# Patient Record
Sex: FEMALE | Race: WHITE | NOT HISPANIC OR LATINO | URBAN - METROPOLITAN AREA
[De-identification: names, ages, dates, MRNs, and addresses within clinical notes are randomized per-mention and may not be internally consistent; named-entity substitution may affect disease eponyms.]

---

## 2019-12-15 ENCOUNTER — INPATIENT (INPATIENT)
Facility: HOSPITAL | Age: 69
LOS: 4 days | Discharge: HOME | DRG: 392 | End: 2019-12-20
Attending: INTERNAL MEDICINE | Admitting: INTERNAL MEDICINE
Payer: COMMERCIAL

## 2019-12-15 VITALS
SYSTOLIC BLOOD PRESSURE: 116 MMHG | HEART RATE: 101 BPM | DIASTOLIC BLOOD PRESSURE: 76 MMHG | HEIGHT: 64 IN | RESPIRATION RATE: 17 BRPM | WEIGHT: 222.01 LBS | OXYGEN SATURATION: 98 % | TEMPERATURE: 98 F

## 2019-12-15 DIAGNOSIS — Z02.9 ENCOUNTER FOR ADMINISTRATIVE EXAMINATIONS, UNSPECIFIED: ICD-10-CM

## 2019-12-15 DIAGNOSIS — D64.9 ANEMIA, UNSPECIFIED: ICD-10-CM

## 2019-12-15 DIAGNOSIS — Z29.9 ENCOUNTER FOR PROPHYLACTIC MEASURES, UNSPECIFIED: ICD-10-CM

## 2019-12-15 DIAGNOSIS — K59.01 SLOW TRANSIT CONSTIPATION: ICD-10-CM

## 2019-12-15 DIAGNOSIS — N18.4 CHRONIC KIDNEY DISEASE, STAGE 4 (SEVERE): ICD-10-CM

## 2019-12-15 DIAGNOSIS — D69.6 THROMBOCYTOPENIA, UNSPECIFIED: ICD-10-CM

## 2019-12-15 DIAGNOSIS — E03.9 HYPOTHYROIDISM, UNSPECIFIED: ICD-10-CM

## 2019-12-15 DIAGNOSIS — C53.9 MALIGNANT NEOPLASM OF CERVIX UTERI, UNSPECIFIED: ICD-10-CM

## 2019-12-15 LAB
ALBUMIN SERPL ELPH-MCNC: 3.6 G/DL — SIGNIFICANT CHANGE UP (ref 3.3–5)
ALP SERPL-CCNC: 133 U/L — HIGH (ref 40–120)
ALT FLD-CCNC: 14 U/L — SIGNIFICANT CHANGE UP (ref 10–45)
ANION GAP SERPL CALC-SCNC: 14 MMOL/L — SIGNIFICANT CHANGE UP (ref 5–17)
APPEARANCE UR: ABNORMAL
APTT BLD: 31.6 SEC — SIGNIFICANT CHANGE UP (ref 27.5–36.3)
AST SERPL-CCNC: 19 U/L — SIGNIFICANT CHANGE UP (ref 10–40)
BACTERIA # UR AUTO: NEGATIVE — SIGNIFICANT CHANGE UP
BASE EXCESS BLDV CALC-SCNC: -1.1 MMOL/L — SIGNIFICANT CHANGE UP (ref -2–2)
BASOPHILS # BLD AUTO: 0.03 K/UL — SIGNIFICANT CHANGE UP (ref 0–0.2)
BASOPHILS NFR BLD AUTO: 0.6 % — SIGNIFICANT CHANGE UP (ref 0–2)
BILIRUB SERPL-MCNC: 0.4 MG/DL — SIGNIFICANT CHANGE UP (ref 0.2–1.2)
BILIRUB UR-MCNC: NEGATIVE — SIGNIFICANT CHANGE UP
BLD GP AB SCN SERPL QL: NEGATIVE — SIGNIFICANT CHANGE UP
BUN SERPL-MCNC: 30 MG/DL — HIGH (ref 7–23)
CA-I SERPL-SCNC: 1.19 MMOL/L — SIGNIFICANT CHANGE UP (ref 1.12–1.3)
CALCIUM SERPL-MCNC: 9.8 MG/DL — SIGNIFICANT CHANGE UP (ref 8.4–10.5)
CHLORIDE BLDV-SCNC: 98 MMOL/L — SIGNIFICANT CHANGE UP (ref 96–108)
CHLORIDE SERPL-SCNC: 96 MMOL/L — SIGNIFICANT CHANGE UP (ref 96–108)
CO2 BLDV-SCNC: 25 MMOL/L — SIGNIFICANT CHANGE UP (ref 22–30)
CO2 SERPL-SCNC: 22 MMOL/L — SIGNIFICANT CHANGE UP (ref 22–31)
COLOR SPEC: SIGNIFICANT CHANGE UP
CREAT SERPL-MCNC: 1.97 MG/DL — HIGH (ref 0.5–1.3)
DIFF PNL FLD: ABNORMAL
EOSINOPHIL # BLD AUTO: 0.03 K/UL — SIGNIFICANT CHANGE UP (ref 0–0.5)
EOSINOPHIL NFR BLD AUTO: 0.6 % — SIGNIFICANT CHANGE UP (ref 0–6)
EPI CELLS # UR: 1 /HPF — SIGNIFICANT CHANGE UP
GAS PNL BLDV: 133 MMOL/L — LOW (ref 135–145)
GAS PNL BLDV: SIGNIFICANT CHANGE UP
GLUCOSE BLDV-MCNC: 87 MG/DL — SIGNIFICANT CHANGE UP (ref 70–99)
GLUCOSE SERPL-MCNC: 90 MG/DL — SIGNIFICANT CHANGE UP (ref 70–99)
GLUCOSE UR QL: NEGATIVE — SIGNIFICANT CHANGE UP
HCO3 BLDV-SCNC: 24 MMOL/L — SIGNIFICANT CHANGE UP (ref 21–29)
HCT VFR BLD CALC: 26 % — LOW (ref 34.5–45)
HCT VFR BLDA CALC: 27 % — LOW (ref 39–50)
HGB BLD CALC-MCNC: 8.6 G/DL — LOW (ref 11.5–15.5)
HGB BLD-MCNC: 8.7 G/DL — LOW (ref 11.5–15.5)
HYALINE CASTS # UR AUTO: 2 /LPF — SIGNIFICANT CHANGE UP (ref 0–2)
IMM GRANULOCYTES NFR BLD AUTO: 1.9 % — HIGH (ref 0–1.5)
INR BLD: 1.15 RATIO — SIGNIFICANT CHANGE UP (ref 0.88–1.16)
KETONES UR-MCNC: ABNORMAL
LACTATE BLDV-MCNC: 1.2 MMOL/L — SIGNIFICANT CHANGE UP (ref 0.7–2)
LEUKOCYTE ESTERASE UR-ACNC: ABNORMAL
LYMPHOCYTES # BLD AUTO: 0.67 K/UL — LOW (ref 1–3.3)
LYMPHOCYTES # BLD AUTO: 12.7 % — LOW (ref 13–44)
MANUAL SMEAR VERIFICATION: SIGNIFICANT CHANGE UP
MCHC RBC-ENTMCNC: 32.7 PG — SIGNIFICANT CHANGE UP (ref 27–34)
MCHC RBC-ENTMCNC: 33.5 GM/DL — SIGNIFICANT CHANGE UP (ref 32–36)
MCV RBC AUTO: 97.7 FL — SIGNIFICANT CHANGE UP (ref 80–100)
MONOCYTES # BLD AUTO: 0.69 K/UL — SIGNIFICANT CHANGE UP (ref 0–0.9)
MONOCYTES NFR BLD AUTO: 13.1 % — SIGNIFICANT CHANGE UP (ref 2–14)
NEUTROPHILS # BLD AUTO: 3.75 K/UL — SIGNIFICANT CHANGE UP (ref 1.8–7.4)
NEUTROPHILS NFR BLD AUTO: 71.1 % — SIGNIFICANT CHANGE UP (ref 43–77)
NITRITE UR-MCNC: NEGATIVE — SIGNIFICANT CHANGE UP
NRBC # BLD: 0 /100 WBCS — SIGNIFICANT CHANGE UP (ref 0–0)
OTHER CELLS CSF MANUAL: 4 ML/DL — LOW (ref 18–22)
PCO2 BLDV: 42 MMHG — SIGNIFICANT CHANGE UP (ref 35–50)
PH BLDV: 7.37 — SIGNIFICANT CHANGE UP (ref 7.35–7.45)
PH UR: 6.5 — SIGNIFICANT CHANGE UP (ref 5–8)
PLAT MORPH BLD: NORMAL — SIGNIFICANT CHANGE UP
PLATELET # BLD AUTO: 88 K/UL — LOW (ref 150–400)
PO2 BLDV: 22 MMHG — LOW (ref 25–45)
POTASSIUM BLDV-SCNC: 3.2 MMOL/L — LOW (ref 3.5–5.3)
POTASSIUM SERPL-MCNC: 3.4 MMOL/L — LOW (ref 3.5–5.3)
POTASSIUM SERPL-SCNC: 3.4 MMOL/L — LOW (ref 3.5–5.3)
PROT SERPL-MCNC: 7.2 G/DL — SIGNIFICANT CHANGE UP (ref 6–8.3)
PROT UR-MCNC: ABNORMAL
PROTHROM AB SERPL-ACNC: 13.2 SEC — HIGH (ref 10–12.9)
RBC # BLD: 2.66 M/UL — LOW (ref 3.8–5.2)
RBC # FLD: 20.4 % — HIGH (ref 10.3–14.5)
RBC BLD AUTO: SIGNIFICANT CHANGE UP
RBC CASTS # UR COMP ASSIST: 1668 /HPF — HIGH (ref 0–4)
RH IG SCN BLD-IMP: NEGATIVE — SIGNIFICANT CHANGE UP
RH IG SCN BLD-IMP: NEGATIVE — SIGNIFICANT CHANGE UP
SAO2 % BLDV: 30 % — LOW (ref 67–88)
SODIUM SERPL-SCNC: 132 MMOL/L — LOW (ref 135–145)
SP GR SPEC: 1.01 — SIGNIFICANT CHANGE UP (ref 1.01–1.02)
TSH SERPL-MCNC: 77.2 UIU/ML — HIGH (ref 0.27–4.2)
UROBILINOGEN FLD QL: NEGATIVE — SIGNIFICANT CHANGE UP
WBC # BLD: 5.27 K/UL — SIGNIFICANT CHANGE UP (ref 3.8–10.5)
WBC # FLD AUTO: 5.27 K/UL — SIGNIFICANT CHANGE UP (ref 3.8–10.5)
WBC UR QL: 145 /HPF — HIGH (ref 0–5)

## 2019-12-15 PROCEDURE — 99285 EMERGENCY DEPT VISIT HI MDM: CPT

## 2019-12-15 PROCEDURE — 99223 1ST HOSP IP/OBS HIGH 75: CPT

## 2019-12-15 PROCEDURE — 74176 CT ABD & PELVIS W/O CONTRAST: CPT | Mod: 26

## 2019-12-15 RX ORDER — SODIUM CHLORIDE 9 MG/ML
1000 INJECTION INTRAMUSCULAR; INTRAVENOUS; SUBCUTANEOUS
Refills: 0 | Status: DISCONTINUED | OUTPATIENT
Start: 2019-12-15 | End: 2019-12-18

## 2019-12-15 RX ORDER — HEPARIN SODIUM 5000 [USP'U]/ML
5000 INJECTION INTRAVENOUS; SUBCUTANEOUS EVERY 12 HOURS
Refills: 0 | Status: DISCONTINUED | OUTPATIENT
Start: 2019-12-15 | End: 2019-12-20

## 2019-12-15 RX ORDER — AMLODIPINE BESYLATE 2.5 MG/1
5 TABLET ORAL DAILY
Refills: 0 | Status: DISCONTINUED | OUTPATIENT
Start: 2019-12-15 | End: 2019-12-18

## 2019-12-15 RX ORDER — POLYETHYLENE GLYCOL 3350 17 G/17G
17 POWDER, FOR SOLUTION ORAL
Refills: 0 | Status: DISCONTINUED | OUTPATIENT
Start: 2019-12-15 | End: 2019-12-16

## 2019-12-15 RX ORDER — LEVOTHYROXINE SODIUM 125 MCG
250 TABLET ORAL DAILY
Refills: 0 | Status: DISCONTINUED | OUTPATIENT
Start: 2019-12-15 | End: 2019-12-20

## 2019-12-15 RX ORDER — SENNA PLUS 8.6 MG/1
2 TABLET ORAL AT BEDTIME
Refills: 0 | Status: DISCONTINUED | OUTPATIENT
Start: 2019-12-15 | End: 2019-12-20

## 2019-12-15 RX ORDER — POTASSIUM CHLORIDE 20 MEQ
20 PACKET (EA) ORAL ONCE
Refills: 0 | Status: COMPLETED | OUTPATIENT
Start: 2019-12-15 | End: 2019-12-15

## 2019-12-15 RX ADMIN — SENNA PLUS 2 TABLET(S): 8.6 TABLET ORAL at 21:56

## 2019-12-15 RX ADMIN — SODIUM CHLORIDE 75 MILLILITER(S): 9 INJECTION INTRAMUSCULAR; INTRAVENOUS; SUBCUTANEOUS at 19:30

## 2019-12-15 RX ADMIN — SODIUM CHLORIDE 75 MILLILITER(S): 9 INJECTION INTRAMUSCULAR; INTRAVENOUS; SUBCUTANEOUS at 21:55

## 2019-12-15 RX ADMIN — Medication 20 MILLIEQUIVALENT(S): at 19:27

## 2019-12-15 RX ADMIN — Medication 250 MICROGRAM(S): at 19:26

## 2019-12-15 NOTE — ED ADULT NURSE REASSESSMENT NOTE - NS ED NURSE REASSESS COMMENT FT1
Report taken from Maria Guadalupe EVANS in [place]. Pt resting in stretcher in NAD and VS as documented with family at bedside. Pt finished drinking oral contrast and is pending CT. Pt and family encouraged to communicate any needs to staff.

## 2019-12-15 NOTE — ED PROVIDER NOTE - ATTENDING CONTRIBUTION TO CARE
Attending MD Kwan.  Agree with above.  Pt is a 70 yo female with pmhx of cervical CA prob stage III (cervical, uterine and nodes), recently completed radiation 2 mos ago, now on chemo.  Pt tried enemas with no relief.  Passing gas, able to tolerate PO.  Pt presents with diffuse abdominal distention, no BM x 7 days.  Pt is also hypothyroid. Attending MD Kwan.  Agree with above.  Pt is a 70 yo female with pmhx of cervical CA prob stage III (cervical, uterine and nodes), recently completed radiation 2 mos ago, now on chemo.  Pt tried enemas with no relief.  Passing gas, able to tolerate PO.  Pt presents with diffuse abdominal distention, no BM x 7 days.  Pt is also hypothyroid.  Planned labs, CT and reassessment.    CT non-actionable but Cr found to be elevated from baseline with concern for KENDALL.  Stable for admission to medicine for KENDALL of unclear origin and constipation.

## 2019-12-15 NOTE — ED ADULT NURSE NOTE - OBJECTIVE STATEMENT
1205 69 yr old WF brought to Er by family for further eval and tx of constipation, no BM x 12 days. PMH Cervical Cancer. Last chemo 11/15/2019. Has mediport right chest. A&Ox4. color pink. skin W&D. Lungs clear. abd soft. Denies pain, N/V, dysura, back pain, fever or chills.

## 2019-12-15 NOTE — ED PROVIDER NOTE - CLINICAL SUMMARY MEDICAL DECISION MAKING FREE TEXT BOX
Agustin PGY3: 69F cervical ca c/b mets to uterus, lymph nodes, s/p radiation therapy dc'd 10/15, oral chemo 11/15 presents with a cc of lower abdominal discomfort x7-10 days, no bowel movements, is passing gas, no prior abdominal surgeries, exam vss non toxic appearing non tender abdomen c/f sbo vs constipation will check labs ctap pain control reassess

## 2019-12-15 NOTE — H&P ADULT - ATTENDING COMMENTS
plan of care discussed with medicine NP   patient admitted on behalf of private medical doctor/group, who are aware of admission to hospital and are responsible for further management

## 2019-12-15 NOTE — ED ADULT NURSE NOTE - NSIMPLEMENTINTERV_GEN_ALL_ED
Implemented All Universal Safety Interventions:  [place] to call system. Call bell, personal items and telephone within reach. Instruct patient to call for assistance. Room bathroom lighting operational. Non-slip footwear when patient is off stretcher. Physically safe environment: no spills, clutter or unnecessary equipment. Stretcher in lowest position, wheels locked, appropriate side rails in place.

## 2019-12-15 NOTE — H&P ADULT - PROBLEM SELECTOR PLAN 8
Transitions of Care Status:  1.  Name of PCP: KITTY  2.  PCP Contacted on Admission: [ ] Y    [x ] N    3.  PCP contacted at Discharge: [ ] Y    [ ] N    [ ] N/A  4.  Post-Discharge Appointment Date and Location:  5.  Summary of Handoff given to PCP:

## 2019-12-15 NOTE — ED PROVIDER NOTE - CARE PLAN
Principal Discharge DX:	Hypothyroid  Secondary Diagnosis:	Constipation  Secondary Diagnosis:	KENDALL (acute kidney injury)

## 2019-12-15 NOTE — H&P ADULT - PROBLEM SELECTOR PLAN 2
elevated TSH likely in part due to poor compliance.  Synthroid dose raised for now pending ENDO consult in AM

## 2019-12-15 NOTE — H&P ADULT - NSHPLABSRESULTS_GEN_ALL_CORE
RBC Count: 2.66 M/uL <L> (12-15 @ 13:46)  Hemoglobin: 8.7 g/dL <L> (12-15 @ 13:46)  Platelet Count - Automated: 88 K/uL <L> (12-15 @ 13:46)  WBC Count: 5.27 K/uL (12-15 @ 13:46)  Hematocrit: 26.0 % <L> (12-15 @ 13:46)      12-15    132<L>  |  96  |  30<H>  ----------------------------<  90  3.4<L>   |  22  |  1.97<H>    Ca    9.8      15 Dec 2019 13:46    TPro  7.2  /  Alb  3.6  /  TBili  0.4  /  DBili  x   /  AST  19  /  ALT  14  /  AlkPhos  133<H>  12-15                PT/INR - ( 15 Dec 2019 13:46 )   PT: 13.2 sec;   INR: 1.15 ratio         PTT - ( 15 Dec 2019 13:46 )  PTT:31.6 sec        Aspartate Aminotransferase (AST/SGOT): 19 U/L (12-15 @ 13:46)  Alkaline Phosphatase, Serum: 133 U/L <H> (12-15 @ 13:46)  Bilirubin Total, Serum: 0.4 mg/dL (12-15 @ 13:46)  Albumin, Serum: 3.6 g/dL (12-15 @ 13:46)  Alanine Aminotransferase (ALT/SGPT): 14 U/L (12-15 @ 13:46)            CT abdomen:   IMPRESSION:     No bowel obstruction.    Mild bilateral hydronephrosis to the level of the urinary bladder which   demonstrates mild wall thickening and surrounding inflammatory changes.   Correlate with urinalysis; findings may be related to radiation changes.

## 2019-12-15 NOTE — ED PROVIDER NOTE - PHYSICAL EXAMINATION
GEN APPEARANCE: WDWN, alert and cooperative, non-toxic appearing and in NAD  HEAD: Atraumatic, normocephalic   EYES: PERRLa, EOMI, vision grossly intact.   EARS: Gross hearing intact.   NOSE: No nasal discharge, no external evidence of epistaxis.   NECK: Supple  CV: RRR, S1S2, no c/r/m/g. No cyanosis or pallor. Extremities warm, well perfused. Cap refill <2 seconds. No bruits.   LUNGS: CTAB. No wheezing. No rales. No rhonchi. No diminished breath sounds.   ABDOMEN: Soft, NTND. No guarding or rebound. No masses. Abdominal fullness.  MSK: Spine appears normal, no spine point tenderness. No CVA ttp. No joint erythema or tenderness. Normal muscular development. Pelvis stable.  EXTREMITIES: No peripheral edema. No obvious joint or bony deformity.  NEURO: Alert, follows commands. Weight bearing normal. Speech normal. Sensation and motor normal x4 extremities.   SKIN: Normal color for race, warm, dry and intact. No evidence of rash.  PSYCH: Normal mood and affect.

## 2019-12-15 NOTE — H&P ADULT - HISTORY OF PRESENT ILLNESS
69F cervical ca c/b mets to uterus, lymph nodes, s/p radiation therapy dc'd 10/15, oral chemo 11/15 presents with a cc of constipation x7-10 days, no bowel movements, is passing gas, no prior abdominal surgeries, no urinary sx. Tolerating PO today. tried saline enema without improvement. Denies f/c/cp/sob. Denies headache, syncope, lightheadedness, dizziness.   CT abdomen in ER shows no SBO.  No N/V.  Fair PO intake.  TSH 77 in ER - patient admits to poor compliance with Synthroid recent weeks.

## 2019-12-15 NOTE — ED PROVIDER NOTE - OBJECTIVE STATEMENT
69F cervical ca c/b mets to uterus, lymph nodes, s/p radiation therapy dc'd 10/15, oral chemo 11/15 presents with a cc of lower abdominal discomfort x7-10 days, no bowel movements, is passing gas, no prior abdominal surgeries, no urinary sx. Tolerating PO today. tried saline enema without improvement. Denies f/c/cp/sob. Denies headache, syncope, lightheadedness, dizziness. Denies chest palpitations, Denies dysuria, hematuria, hematochezia, BRBPR, tarry stools, diarrhea,

## 2019-12-16 LAB
ALBUMIN SERPL ELPH-MCNC: 3.2 G/DL — LOW (ref 3.3–5)
ALP SERPL-CCNC: 119 U/L — SIGNIFICANT CHANGE UP (ref 40–120)
ALT FLD-CCNC: 11 U/L — SIGNIFICANT CHANGE UP (ref 10–45)
ANION GAP SERPL CALC-SCNC: 15 MMOL/L — SIGNIFICANT CHANGE UP (ref 5–17)
AST SERPL-CCNC: 16 U/L — SIGNIFICANT CHANGE UP (ref 10–40)
BILIRUB SERPL-MCNC: 0.3 MG/DL — SIGNIFICANT CHANGE UP (ref 0.2–1.2)
BUN SERPL-MCNC: 28 MG/DL — HIGH (ref 7–23)
CALCIUM SERPL-MCNC: 9.4 MG/DL — SIGNIFICANT CHANGE UP (ref 8.4–10.5)
CHLORIDE SERPL-SCNC: 97 MMOL/L — SIGNIFICANT CHANGE UP (ref 96–108)
CO2 SERPL-SCNC: 20 MMOL/L — LOW (ref 22–31)
CREAT SERPL-MCNC: 1.8 MG/DL — HIGH (ref 0.5–1.3)
GLUCOSE SERPL-MCNC: 87 MG/DL — SIGNIFICANT CHANGE UP (ref 70–99)
HCT VFR BLD CALC: 23.1 % — LOW (ref 34.5–45)
HCV AB S/CO SERPL IA: 0.08 S/CO — SIGNIFICANT CHANGE UP (ref 0–0.99)
HCV AB SERPL-IMP: SIGNIFICANT CHANGE UP
HGB BLD-MCNC: 7.9 G/DL — LOW (ref 11.5–15.5)
MCHC RBC-ENTMCNC: 33.6 PG — SIGNIFICANT CHANGE UP (ref 27–34)
MCHC RBC-ENTMCNC: 34.2 GM/DL — SIGNIFICANT CHANGE UP (ref 32–36)
MCV RBC AUTO: 98.3 FL — SIGNIFICANT CHANGE UP (ref 80–100)
PLATELET # BLD AUTO: 87 K/UL — LOW (ref 150–400)
POTASSIUM SERPL-MCNC: 3.8 MMOL/L — SIGNIFICANT CHANGE UP (ref 3.5–5.3)
POTASSIUM SERPL-SCNC: 3.8 MMOL/L — SIGNIFICANT CHANGE UP (ref 3.5–5.3)
PROT SERPL-MCNC: 6.6 G/DL — SIGNIFICANT CHANGE UP (ref 6–8.3)
RBC # BLD: 2.35 M/UL — LOW (ref 3.8–5.2)
RBC # FLD: 20.7 % — HIGH (ref 10.3–14.5)
SODIUM SERPL-SCNC: 132 MMOL/L — LOW (ref 135–145)
T3 SERPL-MCNC: 45 NG/DL — LOW (ref 80–200)
T4 AB SER-ACNC: 3.7 UG/DL — LOW (ref 4.6–12)
WBC # BLD: 5.22 K/UL — SIGNIFICANT CHANGE UP (ref 3.8–10.5)
WBC # FLD AUTO: 5.22 K/UL — SIGNIFICANT CHANGE UP (ref 3.8–10.5)

## 2019-12-16 PROCEDURE — 71045 X-RAY EXAM CHEST 1 VIEW: CPT | Mod: 26

## 2019-12-16 RX ORDER — ONDANSETRON 8 MG/1
1 TABLET, FILM COATED ORAL
Qty: 0 | Refills: 0 | DISCHARGE

## 2019-12-16 RX ORDER — POLYETHYLENE GLYCOL 3350 17 G/17G
17 POWDER, FOR SOLUTION ORAL
Refills: 0 | Status: DISCONTINUED | OUTPATIENT
Start: 2019-12-16 | End: 2019-12-17

## 2019-12-16 RX ORDER — PANTOPRAZOLE SODIUM 20 MG/1
1 TABLET, DELAYED RELEASE ORAL
Qty: 0 | Refills: 0 | DISCHARGE

## 2019-12-16 RX ORDER — AMLODIPINE BESYLATE 2.5 MG/1
1 TABLET ORAL
Qty: 0 | Refills: 0 | DISCHARGE

## 2019-12-16 RX ORDER — ONDANSETRON 8 MG/1
4 TABLET, FILM COATED ORAL ONCE
Refills: 0 | Status: COMPLETED | OUTPATIENT
Start: 2019-12-16 | End: 2019-12-16

## 2019-12-16 RX ADMIN — Medication 250 MICROGRAM(S): at 06:18

## 2019-12-16 RX ADMIN — AMLODIPINE BESYLATE 5 MILLIGRAM(S): 2.5 TABLET ORAL at 05:59

## 2019-12-16 RX ADMIN — SODIUM CHLORIDE 75 MILLILITER(S): 9 INJECTION INTRAMUSCULAR; INTRAVENOUS; SUBCUTANEOUS at 11:04

## 2019-12-16 RX ADMIN — Medication 10 MILLIGRAM(S): at 13:36

## 2019-12-16 RX ADMIN — POLYETHYLENE GLYCOL 3350 17 GRAM(S): 17 POWDER, FOR SOLUTION ORAL at 06:00

## 2019-12-16 RX ADMIN — ONDANSETRON 4 MILLIGRAM(S): 8 TABLET, FILM COATED ORAL at 20:47

## 2019-12-16 NOTE — PROGRESS NOTE ADULT - SUBJECTIVE AND OBJECTIVE BOX
INTERVAL HPI/OVERNIGHT EVENTS:  Pt seen and examined at bedside.     Allergies/Intolerance: No Known Allergies      MEDICATIONS  (STANDING):  amLODIPine   Tablet 5 milliGRAM(s) Oral daily  heparin  Injectable 5000 Unit(s) SubCutaneous every 12 hours  levothyroxine 250 MICROGram(s) Oral daily  polyethylene glycol 3350 17 Gram(s) Oral two times a day  senna 2 Tablet(s) Oral at bedtime  sodium chloride 0.9%. 1000 milliLiter(s) (75 mL/Hr) IV Continuous <Continuous>    MEDICATIONS  (PRN):  bisacodyl Suppository 10 milliGRAM(s) Rectal daily PRN Constipation        ROS: all systems reviewed and wnl      PHYSICAL EXAMINATION:  Vital Signs Last 24 Hrs  T(C): 36.4 (16 Dec 2019 06:00), Max: 36.7 (15 Dec 2019 13:00)  T(F): 97.5 (16 Dec 2019 06:00), Max: 98.1 (15 Dec 2019 15:23)  HR: 73 (16 Dec 2019 06:00) (73 - 101)  BP: 135/79 (16 Dec 2019 06:00) (98/53 - 139/79)  BP(mean): --  RR: 18 (16 Dec 2019 06:00) (16 - 18)  SpO2: 98% (16 Dec 2019 06:00) (97% - 100%)  CAPILLARY BLOOD GLUCOSE          12-15 @ 07:01  -  12-16 @ 07:00  --------------------------------------------------------  IN: 300 mL / OUT: 0 mL / NET: 300 mL        GENERAL:   NECK: supple, No JVD  CHEST/LUNG: clear to auscultation bilaterally; no rales, rhonchi, or wheezing b/l  HEART: normal S1, S2  ABDOMEN: BS+, soft, ND, NT   EXTREMITIES:  pulses palpable; no clubbing, cyanosis, or edema b/l LEs  SKIN: no rashes or lesions      LABS:                        8.7    5.27  )-----------( 88       ( 15 Dec 2019 13:46 )             26.0     12-15    132<L>  |  96  |  30<H>  ----------------------------<  90  3.4<L>   |  22  |  1.97<H>    Ca    9.8      15 Dec 2019 13:46    TPro  7.2  /  Alb  3.6  /  TBili  0.4  /  DBili  x   /  AST  19  /  ALT  14  /  AlkPhos  133<H>  12-15    PT/INR - ( 15 Dec 2019 13:46 )   PT: 13.2 sec;   INR: 1.15 ratio         PTT - ( 15 Dec 2019 13:46 )  PTT:31.6 sec  Urinalysis Basic - ( 15 Dec 2019 21:12 )    Color: BROWN / Appearance: Slightly Turbid / S.011 / pH: x  Gluc: x / Ketone: Small  / Bili: Negative / Urobili: Negative   Blood: x / Protein: 30 mg/dL / Nitrite: Negative   Leuk Esterase: Large / RBC: 1668 /hpf /  /HPF   Sq Epi: x / Non Sq Epi: 1 /hpf / Bacteria: Negative INTERVAL HPI/OVERNIGHT EVENTS:  Pt seen and examined at bedside.     Allergies/Intolerance: No Known Allergies      MEDICATIONS  (STANDING):  amLODIPine   Tablet 5 milliGRAM(s) Oral daily  heparin  Injectable 5000 Unit(s) SubCutaneous every 12 hours  levothyroxine 250 MICROGram(s) Oral daily  polyethylene glycol 3350 17 Gram(s) Oral two times a day  senna 2 Tablet(s) Oral at bedtime  sodium chloride 0.9%. 1000 milliLiter(s) (75 mL/Hr) IV Continuous <Continuous>    MEDICATIONS  (PRN):  bisacodyl Suppository 10 milliGRAM(s) Rectal daily PRN Constipation        ROS: all systems reviewed and wnl      PHYSICAL EXAMINATION:  Vital Signs Last 24 Hrs  T(C): 36.4 (16 Dec 2019 06:00), Max: 36.7 (15 Dec 2019 13:00)  T(F): 97.5 (16 Dec 2019 06:00), Max: 98.1 (15 Dec 2019 15:23)  HR: 73 (16 Dec 2019 06:00) (73 - 101)  BP: 135/79 (16 Dec 2019 06:00) (98/53 - 139/79)  BP(mean): --  RR: 18 (16 Dec 2019 06:00) (16 - 18)  SpO2: 98% (16 Dec 2019 06:00) (97% - 100%)  CAPILLARY BLOOD GLUCOSE          12-15 @ 07:01  -  12-16 @ 07:00  --------------------------------------------------------  IN: 300 mL / OUT: 0 mL / NET: 300 mL        GENERAL: stable in chair, comfortable, no abdominal pain or fevers, still constipated  NECK: supple, No JVD  CHEST/LUNG: clear to auscultation bilaterally; no rales, rhonchi, or wheezing b/l  HEART: normal S1, S2  ABDOMEN: BS+, soft, ND, NT   EXTREMITIES:  pulses palpable; no clubbing, cyanosis, or edema b/l LEs  SKIN: no rashes or lesions      LABS:                        8.7    5.27  )-----------( 88       ( 15 Dec 2019 13:46 )             26.0     12-15    132<L>  |  96  |  30<H>  ----------------------------<  90  3.4<L>   |  22  |  1.97<H>    Ca    9.8      15 Dec 2019 13:46    TPro  7.2  /  Alb  3.6  /  TBili  0.4  /  DBili  x   /  AST  19  /  ALT  14  /  AlkPhos  133<H>  12-15    PT/INR - ( 15 Dec 2019 13:46 )   PT: 13.2 sec;   INR: 1.15 ratio         PTT - ( 15 Dec 2019 13:46 )  PTT:31.6 sec  Urinalysis Basic - ( 15 Dec 2019 21:12 )    Color: BROWN / Appearance: Slightly Turbid / S.011 / pH: x  Gluc: x / Ketone: Small  / Bili: Negative / Urobili: Negative   Blood: x / Protein: 30 mg/dL / Nitrite: Negative   Leuk Esterase: Large / RBC: 1668 /hpf /  /HPF   Sq Epi: x / Non Sq Epi: 1 /hpf / Bacteria: Negative

## 2019-12-16 NOTE — PROGRESS NOTE ADULT - PROBLEM SELECTOR PLAN 1
bowel regimen ordered.  No GI symptoms bowel regimen ordered. Added Miralax 17 grams bid, ducolox supp. daily for three days and oral senacott.

## 2019-12-16 NOTE — PROGRESS NOTE ADULT - ASSESSMENT
69 female with 69 year old female PMH cervical ca c/b mets to uterus, lymph nodes, s/p radiation therapy dc'd 10/15, oral chemo 11/15 presents with a cc of constipation x7-10 days, no bowel movements, is passing gas, no prior abdominal surgeries, no urinary sx. Tolerating PO today. tried saline enema without improvement at home. Denies f/c/cp/sob. Denies headache, syncope, lightheadedness, dizziness. CT abdomen in ER shows no SBO. TSH elevated 77 in ER - patient admits to poor compliance with Synthroid recent weeks. Restarted at higher dose 250 mcg/day.

## 2019-12-16 NOTE — PROGRESS NOTE ADULT - PROBLEM SELECTOR PLAN 4
avoid nephrotoxics. monitor intake and output. avoid nephrotoxics. monitor intake and output. Has KENDALL on CKD IV. IVF normal saline 75/h. Creatinine better today 1.80. Check daily.

## 2019-12-16 NOTE — PROGRESS NOTE ADULT - PROBLEM SELECTOR PLAN 2
elevated TSH likely in part due to poor compliance.  Synthroid dose raised for now pending ENDO consult in AM elevated TSH likely in part due to poor compliance. Increased dose to 250 mcg/day. TSH elevated 76, low free T-4 3.7. elevated TSH likely in part due to poor compliance. Increased dose to 250 mcg/day. TSH elevated 76, low free T-4 3.7. Repeat TSH in 4 weeks.

## 2019-12-17 LAB
ALBUMIN SERPL ELPH-MCNC: 3 G/DL — LOW (ref 3.3–5)
ALP SERPL-CCNC: 112 U/L — SIGNIFICANT CHANGE UP (ref 40–120)
ALT FLD-CCNC: 10 U/L — SIGNIFICANT CHANGE UP (ref 10–45)
ANION GAP SERPL CALC-SCNC: 15 MMOL/L — SIGNIFICANT CHANGE UP (ref 5–17)
AST SERPL-CCNC: 17 U/L — SIGNIFICANT CHANGE UP (ref 10–40)
BILIRUB SERPL-MCNC: 0.3 MG/DL — SIGNIFICANT CHANGE UP (ref 0.2–1.2)
BUN SERPL-MCNC: 28 MG/DL — HIGH (ref 7–23)
CALCIUM SERPL-MCNC: 8.2 MG/DL — LOW (ref 8.4–10.5)
CHLORIDE SERPL-SCNC: 96 MMOL/L — SIGNIFICANT CHANGE UP (ref 96–108)
CO2 SERPL-SCNC: 19 MMOL/L — LOW (ref 22–31)
CREAT SERPL-MCNC: 1.8 MG/DL — HIGH (ref 0.5–1.3)
GLUCOSE SERPL-MCNC: 107 MG/DL — HIGH (ref 70–99)
GRAM STN FLD: SIGNIFICANT CHANGE UP
GRAM STN FLD: SIGNIFICANT CHANGE UP
HCT VFR BLD CALC: 22.4 % — LOW (ref 34.5–45)
HGB BLD-MCNC: 7.3 G/DL — LOW (ref 11.5–15.5)
LDH SERPL L TO P-CCNC: 159 U/L — SIGNIFICANT CHANGE UP (ref 50–242)
MCHC RBC-ENTMCNC: 32.4 PG — SIGNIFICANT CHANGE UP (ref 27–34)
MCHC RBC-ENTMCNC: 32.6 GM/DL — SIGNIFICANT CHANGE UP (ref 32–36)
MCV RBC AUTO: 99.6 FL — SIGNIFICANT CHANGE UP (ref 80–100)
METHOD TYPE: SIGNIFICANT CHANGE UP
NRBC # BLD: 0 /100 WBCS — SIGNIFICANT CHANGE UP (ref 0–0)
P AERUGINOSA DNA BLD POS NAA+NON-PROBE: SIGNIFICANT CHANGE UP
PLATELET # BLD AUTO: 68 K/UL — LOW (ref 150–400)
POTASSIUM SERPL-MCNC: 3 MMOL/L — LOW (ref 3.5–5.3)
POTASSIUM SERPL-SCNC: 3 MMOL/L — LOW (ref 3.5–5.3)
PROT SERPL-MCNC: 5.7 G/DL — LOW (ref 6–8.3)
RBC # BLD: 2.25 M/UL — LOW (ref 3.8–5.2)
RBC # FLD: 21 % — HIGH (ref 10.3–14.5)
SODIUM SERPL-SCNC: 130 MMOL/L — LOW (ref 135–145)
SPECIMEN SOURCE: SIGNIFICANT CHANGE UP
SPECIMEN SOURCE: SIGNIFICANT CHANGE UP
WBC # BLD: 7.26 K/UL — SIGNIFICANT CHANGE UP (ref 3.8–10.5)
WBC # FLD AUTO: 7.26 K/UL — SIGNIFICANT CHANGE UP (ref 3.8–10.5)

## 2019-12-17 PROCEDURE — 99231 SBSQ HOSP IP/OBS SF/LOW 25: CPT

## 2019-12-17 RX ORDER — CEFTRIAXONE 500 MG/1
1000 INJECTION, POWDER, FOR SOLUTION INTRAMUSCULAR; INTRAVENOUS EVERY 24 HOURS
Refills: 0 | Status: DISCONTINUED | OUTPATIENT
Start: 2019-12-18 | End: 2019-12-18

## 2019-12-17 RX ORDER — CEFTRIAXONE 500 MG/1
1000 INJECTION, POWDER, FOR SOLUTION INTRAMUSCULAR; INTRAVENOUS ONCE
Refills: 0 | Status: COMPLETED | OUTPATIENT
Start: 2019-12-17 | End: 2019-12-17

## 2019-12-17 RX ORDER — POLYETHYLENE GLYCOL 3350 17 G/17G
17 POWDER, FOR SOLUTION ORAL DAILY
Refills: 0 | Status: DISCONTINUED | OUTPATIENT
Start: 2019-12-17 | End: 2019-12-20

## 2019-12-17 RX ORDER — CEFTRIAXONE 500 MG/1
INJECTION, POWDER, FOR SOLUTION INTRAMUSCULAR; INTRAVENOUS
Refills: 0 | Status: DISCONTINUED | OUTPATIENT
Start: 2019-12-17 | End: 2019-12-18

## 2019-12-17 RX ORDER — ACETAMINOPHEN 500 MG
650 TABLET ORAL ONCE
Refills: 0 | Status: COMPLETED | OUTPATIENT
Start: 2019-12-17 | End: 2019-12-17

## 2019-12-17 RX ORDER — POTASSIUM CHLORIDE 20 MEQ
20 PACKET (EA) ORAL
Refills: 0 | Status: COMPLETED | OUTPATIENT
Start: 2019-12-17 | End: 2019-12-17

## 2019-12-17 RX ORDER — SODIUM CHLORIDE 9 MG/ML
500 INJECTION INTRAMUSCULAR; INTRAVENOUS; SUBCUTANEOUS ONCE
Refills: 0 | Status: COMPLETED | OUTPATIENT
Start: 2019-12-17 | End: 2019-12-17

## 2019-12-17 RX ADMIN — Medication 650 MILLIGRAM(S): at 00:25

## 2019-12-17 RX ADMIN — Medication 20 MILLIEQUIVALENT(S): at 04:57

## 2019-12-17 RX ADMIN — SODIUM CHLORIDE 1000 MILLILITER(S): 9 INJECTION INTRAMUSCULAR; INTRAVENOUS; SUBCUTANEOUS at 00:26

## 2019-12-17 RX ADMIN — Medication 250 MICROGRAM(S): at 04:57

## 2019-12-17 RX ADMIN — POLYETHYLENE GLYCOL 3350 17 GRAM(S): 17 POWDER, FOR SOLUTION ORAL at 04:58

## 2019-12-17 RX ADMIN — Medication 650 MILLIGRAM(S): at 01:02

## 2019-12-17 RX ADMIN — Medication 20 MILLIEQUIVALENT(S): at 10:48

## 2019-12-17 RX ADMIN — Medication 20 MILLIEQUIVALENT(S): at 08:03

## 2019-12-17 RX ADMIN — CEFTRIAXONE 100 MILLIGRAM(S): 500 INJECTION, POWDER, FOR SOLUTION INTRAMUSCULAR; INTRAVENOUS at 00:26

## 2019-12-17 NOTE — CHART NOTE - NSCHARTNOTEFT_GEN_A_CORE
Called by RN to evaluate pt with bp of 70/49,  and temp of 100.3  No c/o lightheadedness, dizziness, sob, chest pain, palpitation or abdominal pain. however, mild c/o dysuria.    repeat vital sign below.     Vital Signs Last 24 Hrs  T(C): 37.9 (17 Dec 2019 00:12), Max: 37.9 (17 Dec 2019 00:12)  T(F): 100.3 (17 Dec 2019 00:12), Max: 100.3 (17 Dec 2019 00:12)  HR: 104 (17 Dec 2019 00:29) (73 - 124)  BP: 103/61 (17 Dec 2019 00:29) (70/49 - 139/79)  BP(mean): --  RR: 18 (17 Dec 2019 00:12) (18 - 18)  SpO2: 96% (17 Dec 2019 00:12) (96% - 100%)       Neuro : AAo x 4, Non-toxic appearance  lung : CTA throughout. no extra sound  cardio : s1 and s2. NO extra sound.  abdo : soft, ND/NT  LE : no edema.     < from: CT Abdomen and Pelvis w/ Oral Cont (12.15.19 @ 15:40) >    IMPRESSION:     No bowel obstruction.    Mild bilateral hydronephrosis to the level of the urinary bladder which   demonstrates mild wall thickening and surrounding inflammatory changes.   Correlate with urinalysis; findings may be related to radiation changes.    < end of copied text >    HPI:  69F cervical ca c/b mets to uterus, lymph nodes, s/p radiation therapy dc'd 10/15, oral chemo 11/15 presents with a cc of constipation x7-10 days, no bowel movements, is passing gas, no prior abdominal surgeries, no urinary sx. Tolerating PO today. tried saline enema without improvement. Denies f/c/cp/sob. Denies headache, syncope, lightheadedness, dizziness.     - hypotension likely s/s UTI   positive with UA and pending Ucx   send Bcx x 2   NS bolus 500ml x 1  give tylenol 650mg x 1  start CTX 1g stat and c/w CTX  c/w monitoring for now. No call back. Will assume that this was taken over by surgeon. Encounter closed.

## 2019-12-17 NOTE — CHART NOTE - NSCHARTNOTEFT_GEN_A_CORE
CC: positive blood cultures      HPI:  Called by RN to report positive blood cultures collected 12/17: "Growth in aerobic bottle: Gram Negative Rods." Patient resting comfortably in bed, NAD.         Vital Signs Last 24 Hrs  T(C): 36.4 (17 Dec 2019 22:48), Max: 37.9 (17 Dec 2019 00:12)  T(F): 97.6 (17 Dec 2019 22:48), Max: 100.3 (17 Dec 2019 00:12)  HR: 83 (17 Dec 2019 22:48) (76 - 106)  BP: 108/71 (17 Dec 2019 22:48) (70/49 - 112/67)12/17  RR: 18 (17 Dec 2019 22:48) (18 - 18)  SpO2: 100% (17 Dec 2019 22:48) (96% - 100%)      Physical Exam:  General: Morbidly obese female laying in bed, NAD, chronically ill-appearing  Head:  NC/AT  Skin: (+) warm, dry         Labs:                        7.3    7.26  )-----------( 68       ( 17 Dec 2019 01:05 )             22.4     12-17    130<L>  |  96  |  28<H>  ----------------------------<  107<H>  3.0<L>   |  19<L>  |  1.80<H>    Ca    8.2<L>      17 Dec 2019 01:05    TPro  5.7<L>  /  Alb  3.0<L>  /  TBili  0.3  /  DBili  x   /  AST  17  /  ALT  10  /  AlkPhos  112        Urinalysis Basic - ( 12-15 @ 21:12 )  Color: Negative / Appearance: Negative / SG: -- / pH: Negative  Gluc: Large / Ketone: BROWN  / Bili: -- / Urobili: 2   Blood: 1 / Protein: -- / Nitrite: Small   Leuk Esterase: Large / RBC: 1.011 / WBC --   Sq Epi: 30 mg/dL / Non Sq Epi: 1668 / Bacteria: 6.5        Culture - Blood (collected 12-17-19 @ 03:09)  Source: .Blood Blood-Peripheral  Gram Stain (12-17-19 @ 22:34):    Growth in aerobic bottle: Gram Negative Rods  Preliminary Report (12-17-19 @ 22:34):    Growth in aerobic bottle: Gram Negative Rods    Culture - Blood (collected 12-17-19 @ 03:09)  Source: .Blood Blood-Catheter  Gram Stain (12-17-19 @ 22:21):    Growth in aerobic bottle: Gram Negative Rods  Preliminary Report (12-17-19 @ 22:21):    Growth in aerobic bottle: Gram Negative Rods    "Due to technical problems, Proteus sp. will Not be reported as part of    the BCID panel until further notice"    ***Blood Panel PCR results on this specimen are available    approximately 3 hours after the Gram stain result.***    Gram stain, PCR, and/or culture results may not always    correspond due to difference in methodologies.    ************************************************************    This PCR assay was performed using [employer].    The following targets are tested for: Enterococcus,    vancomycin resistant enterococci, Listeria monocytogenes,    coagulase negative staphylococci, S. aureus,    methicillin resistant S. aureus, Streptococcus agalactiae    (Group B), S. pneumoniae, S. pyogenes (Group A),    Acinetobacter baumannii, Enterobacter cloacae, E. coli,    Klebsiella oxytoca, K. pneumoniae, Proteus sp.,    Serratia marcescens, Haemophilus influenzae,    Neisseria meningitidis, Pseudomonas aeruginosa, Candida    albicans, C. glabrata, C krusei, C parapsilosis,    C. tropicalis and the KPC resistance gene.          Radiology:  < from: Xray Chest 1 View- PORTABLE-Routine (12.16.19 @ 13:08) >  The right chest wall PowerPort tip is in the distal SVC.  < end of copied text >              Assessment & Plan:  69F cervical ca c/b mets to uterus, lymph nodes, s/p radiation therapy dc'd 10/15, oral chemo 11/15 presents with a cc of constipation x7-10 days, no bowel movements, is passing gas, no prior abdominal surgeries, no urinary sx. Tolerating PO today. tried saline enema without improvement. Denies f/c/cp/sob. Denies headache, syncope, lightheadedness, dizziness. CT abdomen in ER shows no SBO.  Patient now presenting acutely with blood cultures positive for gram negative rods in aerobic bottles.         #GNR bacteremia- ?urinary source  -Vital signs hemodynamically stable, patient has been afebrile throughout the day, labs without leukocytosis  -Await speciation of blood cultures  -Repeat blood cultures ordred for AM, discussed with RN  -Continue with ceftriaxone  -Continue with IVF hydration  -Consider ID evaluation if indicated  -Will continue to closely monitor patient/vitals  -Primary Team to follow up in AM, attending to follow       Philip House PA-C  Dept of Medicine  [id number] CC: positive blood cultures      HPI:  Called by RN to report positive blood cultures collected 12/17: "Growth in aerobic bottle: Gram Negative Rods." Patient resting comfortably in bed, NAD.         Vital Signs Last 24 Hrs  T(C): 36.4 (17 Dec 2019 22:48), Max: 37.9 (17 Dec 2019 00:12)  T(F): 97.6 (17 Dec 2019 22:48), Max: 100.3 (17 Dec 2019 00:12)  HR: 83 (17 Dec 2019 22:48) (76 - 106)  BP: 108/71 (17 Dec 2019 22:48) (70/49 - 112/67)12/17  RR: 18 (17 Dec 2019 22:48) (18 - 18)  SpO2: 100% (17 Dec 2019 22:48) (96% - 100%)      Physical Exam:  General: Morbidly obese female laying in bed, NAD, nontoxic appearing  Head:  NC/AT  Skin: (+) warm, dry         Labs:                        7.3    7.26  )-----------( 68       ( 17 Dec 2019 01:05 )             22.4     12-17    130<L>  |  96  |  28<H>  ----------------------------<  107<H>  3.0<L>   |  19<L>  |  1.80<H>    Ca    8.2<L>      17 Dec 2019 01:05    TPro  5.7<L>  /  Alb  3.0<L>  /  TBili  0.3  /  DBili  x   /  AST  17  /  ALT  10  /  AlkPhos  112        Urinalysis Basic - ( 12-15 @ 21:12 )  Color: Negative / Appearance: Negative / SG: -- / pH: Negative  Gluc: Large / Ketone: BROWN  / Bili: -- / Urobili: 2   Blood: 1 / Protein: -- / Nitrite: Small   Leuk Esterase: Large / RBC: 1.011 / WBC --   Sq Epi: 30 mg/dL / Non Sq Epi: 1668 / Bacteria: 6.5        Culture - Blood (collected 12-17-19 @ 03:09)  Source: .Blood Blood-Peripheral  Gram Stain (12-17-19 @ 22:34):    Growth in aerobic bottle: Gram Negative Rods  Preliminary Report (12-17-19 @ 22:34):    Growth in aerobic bottle: Gram Negative Rods    Culture - Blood (collected 12-17-19 @ 03:09)  Source: .Blood Blood-Catheter  Gram Stain (12-17-19 @ 22:21):    Growth in aerobic bottle: Gram Negative Rods  Preliminary Report (12-17-19 @ 22:21):    Growth in aerobic bottle: Gram Negative Rods    "Due to technical problems, Proteus sp. will Not be reported as part of    the BCID panel until further notice"    ***Blood Panel PCR results on this specimen are available    approximately 3 hours after the Gram stain result.***    Gram stain, PCR, and/or culture results may not always    correspond due to difference in methodologies.    ************************************************************    This PCR assay was performed using [employer].    The following targets are tested for: Enterococcus,    vancomycin resistant enterococci, Listeria monocytogenes,    coagulase negative staphylococci, S. aureus,    methicillin resistant S. aureus, Streptococcus agalactiae    (Group B), S. pneumoniae, S. pyogenes (Group A),    Acinetobacter baumannii, Enterobacter cloacae, E. coli,    Klebsiella oxytoca, K. pneumoniae, Proteus sp.,    Serratia marcescens, Haemophilus influenzae,    Neisseria meningitidis, Pseudomonas aeruginosa, Candida    albicans, C. glabrata, C krusei, C parapsilosis,    C. tropicalis and the KPC resistance gene.          Radiology:  < from: Xray Chest 1 View- PORTABLE-Routine (12.16.19 @ 13:08) >  The right chest wall PowerPort tip is in the distal SVC.  < end of copied text >            Assessment & Plan:  69F cervical ca c/b mets to uterus, lymph nodes, s/p radiation therapy dc'd 10/15, oral chemo 11/15 presents with a cc of constipation x7-10 days, no bowel movements, is passing gas, no prior abdominal surgeries, no urinary sx. Tolerating PO today. tried saline enema without improvement. Denies f/c/cp/sob. Denies headache, syncope, lightheadedness, dizziness. CT abdomen in ER shows no SBO.  Patient now presenting acutely with blood cultures positive for gram negative rods in aerobic bottles.         #GNR bacteremia- ?urinary source  -Vital signs hemodynamically stable, patient has been afebrile throughout the day, labs without leukocytosis  -Await speciation of blood cultures  -Repeat blood cultures ordered for AM, discussed with RN  -Continue with ceftriaxone  -Continue with IVF hydration  -Consider ID evaluation if indicated  -Will continue to closely monitor patient/vitals  -Primary Team to follow up in AM, attending to follow       Philip House PA-C  Dept of Medicine  [phone]

## 2019-12-17 NOTE — PROVIDER CONTACT NOTE (CRITICAL VALUE NOTIFICATION) - ACTION/TREATMENT ORDERED:
NP made aware. repeat blood cultures to be ordered for AM labs. No other interventions at this time. will continue to monitor. pt safety maintained

## 2019-12-17 NOTE — PROGRESS NOTE ADULT - PROBLEM SELECTOR PLAN 4
avoid nephrotoxics. monitor intake and output. Has KENDALL on CKD IV. IVF normal saline 75/h. Creatinine better today 1.80. Check daily. avoid nephrotoxics. monitor intake and output. Has KENDALL on CKD IV. IVF normal saline 75/h. Creatinine the same today 1.80, IVF for 24 more hours.

## 2019-12-17 NOTE — PROVIDER CONTACT NOTE (OTHER) - ACTION/TREATMENT ORDERED:
Will await orders from NP. Per MEGHA Molina, will not draw morning labs since labs were drawn earlier with [facility]. Will continue to monitor.

## 2019-12-17 NOTE — PROGRESS NOTE ADULT - PROBLEM SELECTOR PLAN 1
bowel regimen ordered. Added Miralax 17 grams bid, ducolox supp. daily for three days and oral senacott. Had BM, decrease Miralax to 17 grams daily, stop ducolox supp. Continue oral senacott.

## 2019-12-17 NOTE — PROGRESS NOTE ADULT - ASSESSMENT
69 year old female PMH cervical ca c/b mets to uterus, lymph nodes, s/p radiation therapy dc'd 10/15, oral chemo 11/15 presents with a cc of constipation x7-10 days, no bowel movements, is passing gas, no prior abdominal surgeries, no urinary sx. Tolerating PO today. tried saline enema without improvement at home. Denies f/c/cp/sob. Denies headache, syncope, lightheadedness, dizziness. CT abdomen in ER shows no SBO. TSH elevated 77 in ER - patient admits to poor compliance with Synthroid recent weeks. Restarted at higher dose 250 mcg/day.

## 2019-12-17 NOTE — PROGRESS NOTE ADULT - SUBJECTIVE AND OBJECTIVE BOX
INTERVAL HPI/OVERNIGHT EVENTS:  Pt seen and examined at bedside.     Allergies/Intolerance: No Known Allergies      MEDICATIONS  (STANDING):  amLODIPine   Tablet 5 milliGRAM(s) Oral daily  bisacodyl Suppository 10 milliGRAM(s) Rectal daily  cefTRIAXone   IVPB      heparin  Injectable 5000 Unit(s) SubCutaneous every 12 hours  levothyroxine 250 MICROGram(s) Oral daily  polyethylene glycol 3350 17 Gram(s) Oral two times a day  potassium chloride    Tablet ER 20 milliEquivalent(s) Oral every 2 hours  senna 2 Tablet(s) Oral at bedtime  sodium chloride 0.9%. 1000 milliLiter(s) (75 mL/Hr) IV Continuous <Continuous>    MEDICATIONS  (PRN):        ROS: all systems reviewed and wnl      PHYSICAL EXAMINATION:  Vital Signs Last 24 Hrs  T(C): 36.9 (17 Dec 2019 04:55), Max: 37.9 (17 Dec 2019 00:12)  T(F): 98.4 (17 Dec 2019 04:55), Max: 100.3 (17 Dec 2019 00:12)  HR: 94 (17 Dec 2019 04:55) (80 - 124)  BP: 112/67 (17 Dec 2019 04:55) (70/49 - 134/76)  BP(mean): --  RR: 18 (17 Dec 2019 04:55) (18 - 18)  SpO2: 96% (17 Dec 2019 04:55) (96% - 100%)  CAPILLARY BLOOD GLUCOSE          12-16 @ 07:01  -  12-17 @ 07:00  --------------------------------------------------------  IN: 1670 mL / OUT: 102 mL / NET: 1568 mL        GENERAL:   NECK: supple, No JVD  CHEST/LUNG: clear to auscultation bilaterally; no rales, rhonchi, or wheezing b/l  HEART: normal S1, S2  ABDOMEN: BS+, soft, ND, NT   EXTREMITIES:  pulses palpable; no clubbing, cyanosis, or edema b/l LEs  SKIN: no rashes or lesions      LABS:                        7.3    7.26  )-----------( 68       ( 17 Dec 2019 01:05 )             22.4     12-17    130<L>  |  96  |  28<H>  ----------------------------<  107<H>  3.0<L>   |  19<L>  |  1.80<H>    Ca    8.2<L>      17 Dec 2019 01:05    TPro  5.7<L>  /  Alb  3.0<L>  /  TBili  0.3  /  DBili  x   /  AST  17  /  ALT  10  /  AlkPhos  112      PT/INR - ( 15 Dec 2019 13:46 )   PT: 13.2 sec;   INR: 1.15 ratio         PTT - ( 15 Dec 2019 13:46 )  PTT:31.6 sec  Urinalysis Basic - ( 15 Dec 2019 21:12 )    Color: BROWN / Appearance: Slightly Turbid / S.011 / pH: x  Gluc: x / Ketone: Small  / Bili: Negative / Urobili: Negative   Blood: x / Protein: 30 mg/dL / Nitrite: Negative   Leuk Esterase: Large / RBC: 1668 /hpf /  /HPF   Sq Epi: x / Non Sq Epi: 1 /hpf / Bacteria: Negative INTERVAL HPI/OVERNIGHT EVENTS:  Pt seen and examined at bedside.     Allergies/Intolerance: No Known Allergies      MEDICATIONS  (STANDING):  amLODIPine   Tablet 5 milliGRAM(s) Oral daily  bisacodyl Suppository 10 milliGRAM(s) Rectal daily  cefTRIAXone   IVPB      heparin  Injectable 5000 Unit(s) SubCutaneous every 12 hours  levothyroxine 250 MICROGram(s) Oral daily  polyethylene glycol 3350 17 Gram(s) Oral two times a day  potassium chloride    Tablet ER 20 milliEquivalent(s) Oral every 2 hours  senna 2 Tablet(s) Oral at bedtime  sodium chloride 0.9%. 1000 milliLiter(s) (75 mL/Hr) IV Continuous <Continuous>    MEDICATIONS  (PRN):        ROS: all systems reviewed and wnl      PHYSICAL EXAMINATION:  Vital Signs Last 24 Hrs  T(C): 36.9 (17 Dec 2019 04:55), Max: 37.9 (17 Dec 2019 00:12)  T(F): 98.4 (17 Dec 2019 04:55), Max: 100.3 (17 Dec 2019 00:12)  HR: 94 (17 Dec 2019 04:55) (80 - 124)  BP: 112/67 (17 Dec 2019 04:55) (70/49 - 134/76)  BP(mean): --  RR: 18 (17 Dec 2019 04:55) (18 - 18)  SpO2: 96% (17 Dec 2019 04:55) (96% - 100%)  CAPILLARY BLOOD GLUCOSE          12-16 @ 07:01  -  12-17 @ 07:00  --------------------------------------------------------  IN: 1670 mL / OUT: 102 mL / NET: 1568 mL        GENERAL: comfortable in chair, no fevers, had BM, no abdominal pain  NECK: supple, No JVD  CHEST/LUNG: clear to auscultation bilaterally; no rales, rhonchi, or wheezing b/l  HEART: normal S1, S2  ABDOMEN: BS+, soft, ND, NT   EXTREMITIES:  pulses palpable; no clubbing, cyanosis, or edema b/l LEs  SKIN: no rashes or lesions      LABS:                        7.3    7.26  )-----------( 68       ( 17 Dec 2019 01:05 )             22.4     12-    130<L>  |  96  |  28<H>  ----------------------------<  107<H>  3.0<L>   |  19<L>  |  1.80<H>    Ca    8.2<L>      17 Dec 2019 01:05    TPro  5.7<L>  /  Alb  3.0<L>  /  TBili  0.3  /  DBili  x   /  AST  17  /  ALT  10  /  AlkPhos  112  12-17    PT/INR - ( 15 Dec 2019 13:46 )   PT: 13.2 sec;   INR: 1.15 ratio         PTT - ( 15 Dec 2019 13:46 )  PTT:31.6 sec  Urinalysis Basic - ( 15 Dec 2019 21:12 )    Color: BROWN / Appearance: Slightly Turbid / S.011 / pH: x  Gluc: x / Ketone: Small  / Bili: Negative / Urobili: Negative   Blood: x / Protein: 30 mg/dL / Nitrite: Negative   Leuk Esterase: Large / RBC: 1668 /hpf /  /HPF   Sq Epi: x / Non Sq Epi: 1 /hpf / Bacteria: Negative

## 2019-12-17 NOTE — PROGRESS NOTE ADULT - PROBLEM SELECTOR PLAN 2
elevated TSH likely in part due to poor compliance. Increased dose to 250 mcg/day. TSH elevated 76, low free T-4 3.7. Repeat TSH in 4 weeks.

## 2019-12-18 LAB
ANION GAP SERPL CALC-SCNC: 15 MMOL/L — SIGNIFICANT CHANGE UP (ref 5–17)
BASOPHILS # BLD AUTO: 0.02 K/UL — SIGNIFICANT CHANGE UP (ref 0–0.2)
BASOPHILS NFR BLD AUTO: 0.3 % — SIGNIFICANT CHANGE UP (ref 0–2)
BUN SERPL-MCNC: 30 MG/DL — HIGH (ref 7–23)
CALCIUM SERPL-MCNC: 8.7 MG/DL — SIGNIFICANT CHANGE UP (ref 8.4–10.5)
CHLORIDE SERPL-SCNC: 99 MMOL/L — SIGNIFICANT CHANGE UP (ref 96–108)
CO2 SERPL-SCNC: 18 MMOL/L — LOW (ref 22–31)
CREAT SERPL-MCNC: 1.86 MG/DL — HIGH (ref 0.5–1.3)
CULTURE RESULTS: NO GROWTH — SIGNIFICANT CHANGE UP
EOSINOPHIL # BLD AUTO: 0.06 K/UL — SIGNIFICANT CHANGE UP (ref 0–0.5)
EOSINOPHIL NFR BLD AUTO: 0.9 % — SIGNIFICANT CHANGE UP (ref 0–6)
GLUCOSE SERPL-MCNC: 89 MG/DL — SIGNIFICANT CHANGE UP (ref 70–99)
HCT VFR BLD CALC: 21.2 % — LOW (ref 34.5–45)
HGB BLD-MCNC: 7.1 G/DL — LOW (ref 11.5–15.5)
IMM GRANULOCYTES NFR BLD AUTO: 1.4 % — SIGNIFICANT CHANGE UP (ref 0–1.5)
LYMPHOCYTES # BLD AUTO: 0.35 K/UL — LOW (ref 1–3.3)
LYMPHOCYTES # BLD AUTO: 5 % — LOW (ref 13–44)
MCHC RBC-ENTMCNC: 33.5 GM/DL — SIGNIFICANT CHANGE UP (ref 32–36)
MCHC RBC-ENTMCNC: 33.8 PG — SIGNIFICANT CHANGE UP (ref 27–34)
MCV RBC AUTO: 101 FL — HIGH (ref 80–100)
MONOCYTES # BLD AUTO: 0.66 K/UL — SIGNIFICANT CHANGE UP (ref 0–0.9)
MONOCYTES NFR BLD AUTO: 9.4 % — SIGNIFICANT CHANGE UP (ref 2–14)
NEUTROPHILS # BLD AUTO: 5.84 K/UL — SIGNIFICANT CHANGE UP (ref 1.8–7.4)
NEUTROPHILS NFR BLD AUTO: 83 % — HIGH (ref 43–77)
PLATELET # BLD AUTO: 77 K/UL — LOW (ref 150–400)
POTASSIUM SERPL-MCNC: 3.7 MMOL/L — SIGNIFICANT CHANGE UP (ref 3.5–5.3)
POTASSIUM SERPL-SCNC: 3.7 MMOL/L — SIGNIFICANT CHANGE UP (ref 3.5–5.3)
RBC # BLD: 2.1 M/UL — LOW (ref 3.8–5.2)
RBC # FLD: 21.2 % — HIGH (ref 10.3–14.5)
SODIUM SERPL-SCNC: 132 MMOL/L — LOW (ref 135–145)
SPECIMEN SOURCE: SIGNIFICANT CHANGE UP
WBC # BLD: 7.03 K/UL — SIGNIFICANT CHANGE UP (ref 3.8–10.5)
WBC # FLD AUTO: 7.03 K/UL — SIGNIFICANT CHANGE UP (ref 3.8–10.5)

## 2019-12-18 RX ORDER — CEFEPIME 1 G/1
2000 INJECTION, POWDER, FOR SOLUTION INTRAMUSCULAR; INTRAVENOUS EVERY 12 HOURS
Refills: 0 | Status: DISCONTINUED | OUTPATIENT
Start: 2019-12-19 | End: 2019-12-20

## 2019-12-18 RX ORDER — CEFEPIME 1 G/1
INJECTION, POWDER, FOR SOLUTION INTRAMUSCULAR; INTRAVENOUS
Refills: 0 | Status: DISCONTINUED | OUTPATIENT
Start: 2019-12-18 | End: 2019-12-20

## 2019-12-18 RX ORDER — CEFEPIME 1 G/1
2000 INJECTION, POWDER, FOR SOLUTION INTRAMUSCULAR; INTRAVENOUS ONCE
Refills: 0 | Status: COMPLETED | OUTPATIENT
Start: 2019-12-18 | End: 2019-12-18

## 2019-12-18 RX ORDER — AMIKACIN SULFATE 250 MG/ML
500 INJECTION, SOLUTION INTRAMUSCULAR; INTRAVENOUS ONCE
Refills: 0 | Status: COMPLETED | OUTPATIENT
Start: 2019-12-18 | End: 2019-12-18

## 2019-12-18 RX ADMIN — CEFTRIAXONE 100 MILLIGRAM(S): 500 INJECTION, POWDER, FOR SOLUTION INTRAMUSCULAR; INTRAVENOUS at 01:08

## 2019-12-18 RX ADMIN — Medication 250 MICROGRAM(S): at 05:49

## 2019-12-18 RX ADMIN — SENNA PLUS 2 TABLET(S): 8.6 TABLET ORAL at 21:52

## 2019-12-18 RX ADMIN — AMIKACIN SULFATE 102 MILLIGRAM(S): 250 INJECTION, SOLUTION INTRAMUSCULAR; INTRAVENOUS at 15:39

## 2019-12-18 RX ADMIN — CEFEPIME 100 MILLIGRAM(S): 1 INJECTION, POWDER, FOR SOLUTION INTRAMUSCULAR; INTRAVENOUS at 15:05

## 2019-12-18 NOTE — PROGRESS NOTE ADULT - SUBJECTIVE AND OBJECTIVE BOX
INTERVAL HPI/OVERNIGHT EVENTS:  Pt seen and examined at bedside.     Allergies/Intolerance: No Known Allergies      MEDICATIONS  (STANDING):  amLODIPine   Tablet 5 milliGRAM(s) Oral daily  cefTRIAXone   IVPB      cefTRIAXone   IVPB 1000 milliGRAM(s) IV Intermittent every 24 hours  heparin  Injectable 5000 Unit(s) SubCutaneous every 12 hours  levothyroxine 250 MICROGram(s) Oral daily  polyethylene glycol 3350 17 Gram(s) Oral daily  senna 2 Tablet(s) Oral at bedtime  sodium chloride 0.9%. 1000 milliLiter(s) (60 mL/Hr) IV Continuous <Continuous>    MEDICATIONS  (PRN):        ROS: all systems reviewed and wnl      PHYSICAL EXAMINATION:  Vital Signs Last 24 Hrs  T(C): 36.7 (18 Dec 2019 05:24), Max: 36.8 (17 Dec 2019 14:14)  T(F): 98 (18 Dec 2019 05:24), Max: 98.2 (17 Dec 2019 14:14)  HR: 73 (18 Dec 2019 05:24) (73 - 87)  BP: 102/64 (18 Dec 2019 05:24) (98/58 - 121/73)  BP(mean): --  RR: 18 (18 Dec 2019 05:24) (18 - 18)  SpO2: 100% (18 Dec 2019 05:24) (98% - 100%)  CAPILLARY BLOOD GLUCOSE          12-17 @ 07:01  -  12-18 @ 07:00  --------------------------------------------------------  IN: 1370 mL / OUT: 204 mL / NET: 1166 mL        GENERAL:   NECK: supple, No JVD  CHEST/LUNG: clear to auscultation bilaterally; no rales, rhonchi, or wheezing b/l  HEART: normal S1, S2  ABDOMEN: BS+, soft, ND, NT   EXTREMITIES:  pulses palpable; no clubbing, cyanosis, or edema b/l LEs  SKIN: no rashes or lesions      LABS:                        7.3    7.26  )-----------( 68       ( 17 Dec 2019 01:05 )             22.4     12-18    132<L>  |  99  |  30<H>  ----------------------------<  89  3.7   |  18<L>  |  1.86<H>    Ca    8.7      18 Dec 2019 06:23    TPro  5.7<L>  /  Alb  3.0<L>  /  TBili  0.3  /  DBili  x   /  AST  17  /  ALT  10  /  AlkPhos  112  12-17 INTERVAL HPI/OVERNIGHT EVENTS:  Pt seen and examined at bedside.     Allergies/Intolerance: No Known Allergies      MEDICATIONS  (STANDING):  amLODIPine   Tablet 5 milliGRAM(s) Oral daily  cefTRIAXone   IVPB      cefTRIAXone   IVPB 1000 milliGRAM(s) IV Intermittent every 24 hours  heparin  Injectable 5000 Unit(s) SubCutaneous every 12 hours  levothyroxine 250 MICROGram(s) Oral daily  polyethylene glycol 3350 17 Gram(s) Oral daily  senna 2 Tablet(s) Oral at bedtime  sodium chloride 0.9%. 1000 milliLiter(s) (60 mL/Hr) IV Continuous <Continuous>    MEDICATIONS  (PRN):        ROS: all systems reviewed and wnl      PHYSICAL EXAMINATION:  Vital Signs Last 24 Hrs  T(C): 36.7 (18 Dec 2019 05:24), Max: 36.8 (17 Dec 2019 14:14)  T(F): 98 (18 Dec 2019 05:24), Max: 98.2 (17 Dec 2019 14:14)  HR: 73 (18 Dec 2019 05:24) (73 - 87)  BP: 102/64 (18 Dec 2019 05:24) (98/58 - 121/73)  BP(mean): --  RR: 18 (18 Dec 2019 05:24) (18 - 18)  SpO2: 100% (18 Dec 2019 05:24) (98% - 100%)  CAPILLARY BLOOD GLUCOSE          12-17 @ 07:01  -  12-18 @ 07:00  --------------------------------------------------------  IN: 1370 mL / OUT: 204 mL / NET: 1166 mL        GENERAL: stable, feels better, eager to go home.   NECK: supple, No JVD  CHEST/LUNG: clear to auscultation bilaterally; no rales, rhonchi, or wheezing b/l  HEART: normal S1, S2  ABDOMEN: BS+, soft, ND, NT   EXTREMITIES:  pulses palpable; no clubbing, cyanosis, or edema b/l LEs  SKIN: no rashes or lesions      LABS:                        7.3    7.26  )-----------( 68       ( 17 Dec 2019 01:05 )             22.4     12-18    132<L>  |  99  |  30<H>  ----------------------------<  89  3.7   |  18<L>  |  1.86<H>    Ca    8.7      18 Dec 2019 06:23    TPro  5.7<L>  /  Alb  3.0<L>  /  TBili  0.3  /  DBili  x   /  AST  17  /  ALT  10  /  AlkPhos  112  12-17

## 2019-12-18 NOTE — CONSULT NOTE ADULT - SUBJECTIVE AND OBJECTIVE BOX
HPI:   Patient is a 69y female with cervical cancer metastatic to nodes and invasive to uterus who was given RT and chemo in October to November. She had a mediport placed in October. She developed severe constipation and poor po intake over the past couple of weeks so came to the hospital for further care. She has elevated cr that did not improve with hydration and the ct shows hydronephrosis. She has had bowel movement. She was found hypothyroid and is getting more synthroid. She had a fever early yesterday and blood cultures grew pseudomonas. WE are called. Patient has no dysuria, no sob, cp, n,v,d, at present, no flank pain.    REVIEW OF SYSTEMS:  All other review of systems negative (Comprehensive ROS)    PAST MEDICAL & SURGICAL HISTORY:  Hypothyroid  Cancer of cervix  No significant past surgical history      Allergies    No Known Allergies    Intolerances        Antimicrobials Day #  :1  amiKACIN  IVPB 500 milliGRAM(s) IV Intermittent once  cefepime   IVPB 2000 milliGRAM(s) IV Intermittent once  cefepime   IVPB        Other Medications:  heparin  Injectable 5000 Unit(s) SubCutaneous every 12 hours  levothyroxine 250 MICROGram(s) Oral daily  polyethylene glycol 3350 17 Gram(s) Oral daily  senna 2 Tablet(s) Oral at bedtime      FAMILY HISTORY:  Family history of melanoma      SOCIAL HISTORY:  Smoking: [ ]Yes [ x]No  ETOH: [ ]Yes [ x]No  Drug Use: [ ]Yes [x ]No   [ x] Single[ ]    T(F): 97.8 (12-18-19 @ 12:56), Max: 98.2 (12-17-19 @ 14:14)  HR: 85 (12-18-19 @ 12:56)  BP: 110/73 (12-18-19 @ 12:56)  RR: 18 (12-18-19 @ 12:56)  SpO2: 96% (12-18-19 @ 12:56)  Wt(kg): --    PHYSICAL EXAM:  General: alert, no acute distress  Eyes:  anicteric, no conjunctival injection, no discharge  Oropharynx: no lesions or injection 	  Neck: supple, without adenopathy  Lungs: clear to auscultation  Heart: regular rate and rhythm; no murmur, rubs or gallops  Abdomen: soft, nondistended, nontender, without mass or organomegaly  Skin: no lesions  Extremities: no clubbing, cyanosis,. legs with edema  Neurologic: alert, oriented, moves all extremities    LAB RESULTS:                        7.1    7.03  )-----------( 77       ( 18 Dec 2019 08:06 )             21.2     12-18    132<L>  |  99  |  30<H>  ----------------------------<  89  3.7   |  18<L>  |  1.86<H>    Ca    8.7      18 Dec 2019 06:23    TPro  5.7<L>  /  Alb  3.0<L>  /  TBili  0.3  /  DBili  x   /  AST  17  /  ALT  10  /  AlkPhos  112  12-17    LIVER FUNCTIONS - ( 17 Dec 2019 01:05 )  Alb: 3.0 g/dL / Pro: 5.7 g/dL / ALK PHOS: 112 U/L / ALT: 10 U/L / AST: 17 U/L / GGT: x               MICROBIOLOGY:  RECENT CULTURES:  12-17 @ 03:09 .Blood Blood-Catheter Blood Culture PCR    Growth in aerobic bottle: Gram Negative Rods  "Due to technical problems, Proteus sp. will Not be reported as part of  the BCID panel until further notice"  ***Blood Panel PCR results on this specimen are available  approximately 3 hours after the Gram stain result.***  Gram stain, PCR, and/or culture results may not always  correspond due to difference in methodologies.  ************************************************************  This PCR assay was performed using [employer].  The following targets are tested for: Enterococcus,  vancomycin resistant enterococci, Listeria monocytogenes,  coagulase negative staphylococci, S. aureus,  methicillin resistant S. aureus, Streptococcus agalactiae  (Group B), S. pneumoniae, S. pyogenes (Group A),  Acinetobacter baumannii, Enterobacter cloacae, E. coli,  Klebsiella oxytoca, K. pneumoniae, Proteus sp.,  Serratia marcescens, Haemophilus influenzae,  Neisseria meningitidis, Pseudomonas aeruginosa, Candida  albicans, C. glabrata, C krusei, C parapsilosis,  C. tropicalis and the KPC resistance gene.    Growth in aerobic bottle: Gram Negative Rods          RADIOLOGY REVIEWED:    < from: CT Abdomen and Pelvis w/ Oral Cont (12.15.19 @ 15:40) >    EXAM:  CT ABDOMEN AND PELVIS OC                            PROCEDURE DATE:  12/15/2019            INTERPRETATION:  CLINICAL INFORMATION: Patient with cervical cancer   status post chemoradiation in October and November of this year   presenting with lower abdominal discomfort for 7 to 10 days.    COMPARISON: None.    PROCEDURE:   CT of the Abdomen and Pelvis was performed without intravenous contrast.   Intravenous contrast: None.  Oral contrast: positive contrast was administered.  Sagittal and coronal reformats were performed.    FINDINGS:    LOWER CHEST: Coronary artery calcifications. Slightly asymmetric tissue   in the left retroareolar region (2:5).    Evaluation of the solid organs and vasculature is limited without the   administration of intravenous contrast.    LIVER: Within normal limits.  BILE DUCTS: Normal caliber.  GALLBLADDER: Within normal limits.  SPLEEN: Within normal limits.  PANCREAS: Within normal limits.  ADRENALS: Nonspecific coarse calcifications involving the right adrenal   gland. Left adrenal gland within normal limits.  KIDNEYS/URETERS: Mild bilateral hydroureteronephrosis to the level of the   urinary bladder without obstructing calculus visualized.    BLADDER: Diffuse wall thickening and surrounding inflammatory changes.  REPRODUCTIVE ORGANS: Air within the endometrial cavity. Evaluation of   patient's cervical cancer is limited on noncontrast CT technique.    BOWEL: Small hiatal hernia. No bowel obstruction. Colonic diverticulosis.   Contrast does not fill the appendix, however, there is no significant   dilatation or surrounding inflammatory change.  PERITONEUM: No ascites.  VESSELS: Atherosclerotic changes.  RETROPERITONEUM/LYMPH NODES: No lymphadenopathy.    ABDOMINAL WALL: Small fat-containing umbilical hernia. Nonspecific mild   soft tissue swelling of the midline dorsal soft tissues and left gluteal   region.  BONES: Degenerative changes with hypertrophy of the lumbar spinous   processes.    IMPRESSION:     No bowel obstruction.    Mild bilateral hydronephrosis to the level of the urinary bladder which   demonstrates mild wall thickening and surrounding inflammatory changes.   Correlate with urinalysis; findings may be related to radiation changes.    < end of copied text >        Impression:  Patient with cervical ca, s/p RT and some chemo, admitted for constipation, dehydration and hypothyroidism, had a fever, blood drawn from peripheral and port grew pseudomonas. She has hydronephrosis and some renal insufficiency and gu source is very possible so to clear infection she may need to have her ureters decompressed. She could also have a port infection    Recommendations:   will start cefepime and give a dose of amikacin  repeat blood cultures in am  Dr. Croft to review case with her MD and [facility] and then to decide about decompression of kidneys  if cannot control bacteremia may need port removed as well or if cultures grow from port again.

## 2019-12-18 NOTE — PROVIDER CONTACT NOTE (OTHER) - ACTION/TREATMENT ORDERED:
Med NP Ingris Joshi made aware. Risks & benefits gone over with patient: verbalizes understanding. Med MEGHA Joshi aware, to come discuss with patient.

## 2019-12-18 NOTE — PROGRESS NOTE ADULT - PROBLEM SELECTOR PLAN 4
avoid nephrotoxics. monitor intake and output. Has KENDALL on CKD IV. IVF normal saline 75/h. Creatinine the same today 1.80, IVF for 24 more hours. avoid nephrotoxics. monitor intake and output. Has KENDALL on CKD IV. IVF normal saline stopped. Creatinine the same today 1.80, Mild bilateral hydro might be contributing to KENDALL. [facility] for surgery on uterus.

## 2019-12-19 LAB
-  AMIKACIN: SIGNIFICANT CHANGE UP
-  AZTREONAM: SIGNIFICANT CHANGE UP
-  CEFEPIME: SIGNIFICANT CHANGE UP
-  CEFTAZIDIME: SIGNIFICANT CHANGE UP
-  CIPROFLOXACIN: SIGNIFICANT CHANGE UP
-  GENTAMICIN: SIGNIFICANT CHANGE UP
-  IMIPENEM: SIGNIFICANT CHANGE UP
-  LEVOFLOXACIN: SIGNIFICANT CHANGE UP
-  MEROPENEM: SIGNIFICANT CHANGE UP
-  PIPERACILLIN/TAZOBACTAM: SIGNIFICANT CHANGE UP
-  TOBRAMYCIN: SIGNIFICANT CHANGE UP
CULTURE RESULTS: SIGNIFICANT CHANGE UP
CULTURE RESULTS: SIGNIFICANT CHANGE UP
GRAM STN FLD: SIGNIFICANT CHANGE UP
HCT VFR BLD CALC: 20.9 % — CRITICAL LOW (ref 34.5–45)
HGB BLD-MCNC: 7 G/DL — CRITICAL LOW (ref 11.5–15.5)
MCHC RBC-ENTMCNC: 33.5 GM/DL — SIGNIFICANT CHANGE UP (ref 32–36)
MCHC RBC-ENTMCNC: 33.5 PG — SIGNIFICANT CHANGE UP (ref 27–34)
MCV RBC AUTO: 100 FL — SIGNIFICANT CHANGE UP (ref 80–100)
METHOD TYPE: SIGNIFICANT CHANGE UP
ORGANISM # SPEC MICROSCOPIC CNT: SIGNIFICANT CHANGE UP
PLATELET # BLD AUTO: 88 K/UL — LOW (ref 150–400)
RBC # BLD: 2.09 M/UL — LOW (ref 3.8–5.2)
RBC # FLD: 21.2 % — HIGH (ref 10.3–14.5)
SPECIMEN SOURCE: SIGNIFICANT CHANGE UP
SPECIMEN SOURCE: SIGNIFICANT CHANGE UP
WBC # BLD: 5.56 K/UL — SIGNIFICANT CHANGE UP (ref 3.8–10.5)
WBC # FLD AUTO: 5.56 K/UL — SIGNIFICANT CHANGE UP (ref 3.8–10.5)

## 2019-12-19 RX ADMIN — SENNA PLUS 2 TABLET(S): 8.6 TABLET ORAL at 20:57

## 2019-12-19 RX ADMIN — CEFEPIME 100 MILLIGRAM(S): 1 INJECTION, POWDER, FOR SOLUTION INTRAMUSCULAR; INTRAVENOUS at 05:20

## 2019-12-19 RX ADMIN — CEFEPIME 100 MILLIGRAM(S): 1 INJECTION, POWDER, FOR SOLUTION INTRAMUSCULAR; INTRAVENOUS at 18:23

## 2019-12-19 RX ADMIN — Medication 250 MICROGRAM(S): at 05:20

## 2019-12-19 NOTE — PROVIDER CONTACT NOTE (OTHER) - ASSESSMENT
VSS. No s/s of acute distress.
Pt refsuing BC this am. States she is going home today and has "had enough of the blood cultures". RN explained the need to trend BC, pt still refusing.
pt BP 70/49. . temp 100.3. All other VSS. Pt denies chills or rigors. Denies lightheadedness. Pt A&Ox4, pt states she "feels fine".
pt K+ 3.0. Pt stable.
pt complaining of chills and rigors. temp 98.8. . All other VSS. Pt denies chest pain or shortness of breath.
pt refusing heparin, pt educated on risks and benefits. Pt still refusing.

## 2019-12-19 NOTE — PROVIDER CONTACT NOTE (OTHER) - BACKGROUND
Pt admitted for constipation and UTI.
pt admitted with constipation
pt admitted with constipation
pt admitted with constipation x10 days.
pt admitted with constipation, +UTI and BC.
pt admitted with constipation. +UTI.

## 2019-12-19 NOTE — PROGRESS NOTE ADULT - SUBJECTIVE AND OBJECTIVE BOX
INTERVAL HPI/OVERNIGHT EVENTS:  Pt seen and examined at bedside.     Allergies/Intolerance: No Known Allergies      MEDICATIONS  (STANDING):  cefepime   IVPB 2000 milliGRAM(s) IV Intermittent every 12 hours  cefepime   IVPB      heparin  Injectable 5000 Unit(s) SubCutaneous every 12 hours  levothyroxine 250 MICROGram(s) Oral daily  polyethylene glycol 3350 17 Gram(s) Oral daily  senna 2 Tablet(s) Oral at bedtime    MEDICATIONS  (PRN):        ROS: all systems reviewed and wnl      PHYSICAL EXAMINATION:  Vital Signs Last 24 Hrs  T(C): 36.6 (19 Dec 2019 08:52), Max: 37.2 (18 Dec 2019 21:53)  T(F): 97.8 (19 Dec 2019 08:52), Max: 98.9 (18 Dec 2019 21:53)  HR: 87 (19 Dec 2019 08:52) (80 - 87)  BP: 117/70 (19 Dec 2019 08:52) (98/60 - 141/79)  BP(mean): --  RR: 18 (19 Dec 2019 08:52) (18 - 18)  SpO2: 98% (19 Dec 2019 08:52) (96% - 100%)  CAPILLARY BLOOD GLUCOSE          12-18 @ 07:01  -  12-19 @ 07:00  --------------------------------------------------------  IN: 1350 mL / OUT: 1 mL / NET: 1349 mL    12-19 @ 07:01  -  12-19 @ 08:59  --------------------------------------------------------  IN: 240 mL / OUT: 0 mL / NET: 240 mL        GENERAL:   NECK: supple, No JVD  CHEST/LUNG: clear to auscultation bilaterally; no rales, rhonchi, or wheezing b/l  HEART: normal S1, S2  ABDOMEN: BS+, soft, ND, NT   EXTREMITIES:  pulses palpable; no clubbing, cyanosis, or edema b/l LEs  SKIN: no rashes or lesions      LABS:                        7.1    7.03  )-----------( 77       ( 18 Dec 2019 08:06 )             21.2     12-18    132<L>  |  99  |  30<H>  ----------------------------<  89  3.7   |  18<L>  |  1.86<H>    Ca    8.7      18 Dec 2019 06:23 INTERVAL HPI/OVERNIGHT EVENTS:  Pt seen and examined at bedside.     Allergies/Intolerance: No Known Allergies      MEDICATIONS  (STANDING):  cefepime   IVPB 2000 milliGRAM(s) IV Intermittent every 12 hours  cefepime   IVPB      heparin  Injectable 5000 Unit(s) SubCutaneous every 12 hours  levothyroxine 250 MICROGram(s) Oral daily  polyethylene glycol 3350 17 Gram(s) Oral daily  senna 2 Tablet(s) Oral at bedtime    MEDICATIONS  (PRN):        ROS: all systems reviewed and wnl      PHYSICAL EXAMINATION:  Vital Signs Last 24 Hrs  T(C): 36.6 (19 Dec 2019 08:52), Max: 37.2 (18 Dec 2019 21:53)  T(F): 97.8 (19 Dec 2019 08:52), Max: 98.9 (18 Dec 2019 21:53)  HR: 87 (19 Dec 2019 08:52) (80 - 87)  BP: 117/70 (19 Dec 2019 08:52) (98/60 - 141/79)  BP(mean): --  RR: 18 (19 Dec 2019 08:52) (18 - 18)  SpO2: 98% (19 Dec 2019 08:52) (96% - 100%)  CAPILLARY BLOOD GLUCOSE          12-18 @ 07:01  -  12-19 @ 07:00  --------------------------------------------------------  IN: 1350 mL / OUT: 1 mL / NET: 1349 mL    12-19 @ 07:01  -  12-19 @ 08:59  --------------------------------------------------------  IN: 240 mL / OUT: 0 mL / NET: 240 mL        GENERAL: stable in chair, comfortable, no fevers over night, eager to go home   NECK: supple, No JVD  CHEST/LUNG: clear to auscultation bilaterally; no rales, rhonchi, or wheezing b/l  HEART: normal S1, S2  ABDOMEN: BS+, soft, ND, NT   EXTREMITIES:  pulses palpable; no clubbing, cyanosis, or edema b/l LEs  SKIN: no rashes or lesions      LABS:                        7.1    7.03  )-----------( 77       ( 18 Dec 2019 08:06 )             21.2     12-18    132<L>  |  99  |  30<H>  ----------------------------<  89  3.7   |  18<L>  |  1.86<H>    Ca    8.7      18 Dec 2019 06:23

## 2019-12-19 NOTE — PROVIDER CONTACT NOTE (OTHER) - REASON
pt 
pt K+ 3.0, no K+ ordered
pt bp 70/49. .
pt refusing BC
pt refusing heparin
PT refusing heparin injection

## 2019-12-19 NOTE — PROGRESS NOTE ADULT - PROBLEM SELECTOR PLAN 4
avoid nephrotoxics. monitor intake and output. Has KENDALL on CKD IV. IVF normal saline stopped. Creatinine the same today 1.80, Mild bilateral hydro might be contributing to KENDALL. Will speak to Oncology team in [facility] regarding plans for surgery on uterus. avoid nephrotoxics. monitor intake and output. Has KENDALL on CKD IV. IVF normal saline stopped. Creatinine the same today 1.80, Mild bilateral hydro might be contributing to KENDALL. Spoke to Oncology team in [facility] regarding plans for surgery on uterus. They informed me CT scans appeared same as before. Creatinine   normally at 2.0.

## 2019-12-19 NOTE — PROVIDER CONTACT NOTE (OTHER) - RECOMMENDATIONS
MEGHA Molina made aware
NP made aware
Pa made aware
Med NP Ingris Joshi made aware. Risks & benefits gone over with patient: verbalizes understanding.

## 2019-12-19 NOTE — PROGRESS NOTE ADULT - PROBLEM SELECTOR PLAN 3
outpt onc f/up Blood cultures positive for Psudomonas 4/4. Continue Cefapeme 2 grams every 12 hours. Likely  source. Urine culture negative. Await sensitivities.

## 2019-12-19 NOTE — PROVIDER CONTACT NOTE (CRITICAL VALUE NOTIFICATION) - ACTION/TREATMENT ORDERED:
Will contuine to monitor. will discuss w/ attending for need of transfusion or not. pt contuined on IV antibiotics

## 2019-12-19 NOTE — PROGRESS NOTE ADULT - SUBJECTIVE AND OBJECTIVE BOX
CC: f/u for pseudomonas bacteremia  Patient reports  feels fine  REVIEW OF SYSTEMS:  All other review of systems negative (Comprehensive ROS)    Antimicrobials Day #  :2  cefepime   IVPB 2000 milliGRAM(s) IV Intermittent every 12 hours  cefepime   IVPB        Other Medications Reviewed    T(F): 98.4 (12-19-19 @ 17:15), Max: 98.9 (12-18-19 @ 21:53)  HR: 89 (12-19-19 @ 17:15)  BP: 115/75 (12-19-19 @ 17:15)  RR: 18 (12-19-19 @ 17:15)  SpO2: 100% (12-19-19 @ 17:15)  Wt(kg): --    PHYSICAL EXAM:  General: alert, no acute distress  Eyes:  anicteric, no conjunctival injection, no discharge  Oropharynx: no lesions or injection 	  Neck: supple, without adenopathy  Lungs: clear to auscultation  Heart: regular rate and rhythm; no murmur, rubs or gallops  Abdomen: soft, nondistended, nontender, without mass or organomegaly  Skin: no lesions  Extremities: no clubbing, cyanosis, . legs edema  Neurologic: alert, oriented, moves all extremities    LAB RESULTS:                        7.0    5.56  )-----------( 88       ( 19 Dec 2019 08:05 )             20.9     12-18    132<L>  |  99  |  30<H>  ----------------------------<  89  3.7   |  18<L>  |  1.86<H>    Ca    8.7      18 Dec 2019 06:23          MICROBIOLOGY:  RECENT CULTURES:  12-18 @ 08:59 .Blood Blood-Peripheral     No growth to date.    Growth in aerobic bottle: Gram Negative Rods    12-17 @ 20:31 .Urine Clean Catch (Midstream)     No growth      12-17 @ 03:09 .Blood Blood-Catheter Blood Culture PCR    Growth in aerobic bottle: Pseudomonas aeruginosa  See previous culture [id number]  "Due to technical problems, Proteus sp. will Not be reported as part of  the BCID panel until further notice"  ***Blood Panel PCR results on this specimen are available  approximately 3 hours after the Gram stain result.***  Gram stain, PCR, and/or culture results may not always  correspond due to difference in methodologies.  ************************************************************  This PCR assay was performed using [employer].  The following targets are tested for: Enterococcus,  vancomycin resistant enterococci, Listeria monocytogenes,  coagulase negative staphylococci, S. aureus,  methicillin resistant S. aureus, Streptococcus agalactiae  (Group B), S. pneumoniae, S. pyogenes (Group A),  Acinetobacter baumannii, Enterobacter cloacae, E. coli,  Klebsiella oxytoca, K. pneumoniae, Proteus sp.,  Serratia marcescens, Haemophilus influenzae,  Neisseria meningitidis, Pseudomonas aeruginosa, Candida  albicans, C. glabrata, C krusei, C parapsilosis,  C. tropicalis and the KPC resistance gene.    Growth in aerobic bottle: Gram Negative Rods        RADIOLOGY REVIEWED:    < from: CT Abdomen and Pelvis w/ Oral Cont (12.15.19 @ 15:40) >  EXAM:  CT ABDOMEN AND PELVIS OC                            PROCEDURE DATE:  12/15/2019            INTERPRETATION:  CLINICAL INFORMATION: Patient with cervical cancer   status post chemoradiation in October and November of this year   presenting with lower abdominal discomfort for 7 to 10 days.    COMPARISON: None.    PROCEDURE:   CT of the Abdomen and Pelvis was performed without intravenous contrast.   Intravenous contrast: None.  Oral contrast: positive contrast was administered.  Sagittal and coronal reformats were performed.    FINDINGS:    LOWER CHEST: Coronary artery calcifications. Slightly asymmetric tissue   in the left retroareolar region (2:5).    Evaluation of the solid organs and vasculature is limited without the   administration of intravenous contrast.    LIVER: Within normal limits.  BILE DUCTS: Normal caliber.  GALLBLADDER: Within normal limits.  SPLEEN: Within normal limits.  PANCREAS: Within normal limits.  ADRENALS: Nonspecific coarse calcifications involving the right adrenal   gland. Left adrenal gland within normal limits.  KIDNEYS/URETERS: Mild bilateral hydroureteronephrosis to the level of the   urinary bladder without obstructing calculus visualized.    BLADDER: Diffuse wall thickening and surrounding inflammatory changes.  REPRODUCTIVE ORGANS: Air within the endometrial cavity. Evaluation of   patient's cervical cancer is limited on noncontrast CT technique.    BOWEL: Small hiatal hernia. No bowel obstruction. Colonic diverticulosis.   Contrast does not fill the appendix, however, there is no significant   dilatation or surrounding inflammatory change.  PERITONEUM: No ascites.  VESSELS: Atherosclerotic changes.  RETROPERITONEUM/LYMPH NODES: No lymphadenopathy.    ABDOMINAL WALL: Small fat-containing umbilical hernia. Nonspecific mild   soft tissue swelling of the midline dorsal soft tissues and left gluteal   region.  BONES: Degenerative changes with hypertrophy of the lumbar spinous   processes.    IMPRESSION:     No bowel obstruction.    Mild bilateral hydronephrosis to the level of the urinary bladder which   demonstrates mild wall thickening and surrounding inflammatory changes.   Correlate with urinalysis; findings may be related to radiation changes.                  < end of copied text >    Assessment:  Patient with cervical ca, s/p RT,CHemo, hydro, mild renal insuf from platinum admitted for constipation and dehydration, had transient fever and found to have pseudomonas bacteremia. Source could be port or potentially obstructed kidneys  Plan:  continue cefepime, will need home IV  will try to salvage port  repeat blood cultures  since hydro stable and cr stable no plan right now to decompress kidneys

## 2019-12-20 VITALS
HEART RATE: 88 BPM | RESPIRATION RATE: 18 BRPM | SYSTOLIC BLOOD PRESSURE: 106 MMHG | TEMPERATURE: 98 F | DIASTOLIC BLOOD PRESSURE: 66 MMHG | OXYGEN SATURATION: 100 %

## 2019-12-20 LAB
CULTURE RESULTS: SIGNIFICANT CHANGE UP
SPECIMEN SOURCE: SIGNIFICANT CHANGE UP

## 2019-12-20 PROCEDURE — 87040 BLOOD CULTURE FOR BACTERIA: CPT

## 2019-12-20 PROCEDURE — 74176 CT ABD & PELVIS W/O CONTRAST: CPT

## 2019-12-20 PROCEDURE — 85730 THROMBOPLASTIN TIME PARTIAL: CPT

## 2019-12-20 PROCEDURE — 87150 DNA/RNA AMPLIFIED PROBE: CPT

## 2019-12-20 PROCEDURE — 84295 ASSAY OF SERUM SODIUM: CPT

## 2019-12-20 PROCEDURE — 87186 SC STD MICRODIL/AGAR DIL: CPT

## 2019-12-20 PROCEDURE — 85610 PROTHROMBIN TIME: CPT

## 2019-12-20 PROCEDURE — 82803 BLOOD GASES ANY COMBINATION: CPT

## 2019-12-20 PROCEDURE — 85014 HEMATOCRIT: CPT

## 2019-12-20 PROCEDURE — 82947 ASSAY GLUCOSE BLOOD QUANT: CPT

## 2019-12-20 PROCEDURE — 87086 URINE CULTURE/COLONY COUNT: CPT

## 2019-12-20 PROCEDURE — 82330 ASSAY OF CALCIUM: CPT

## 2019-12-20 PROCEDURE — 99285 EMERGENCY DEPT VISIT HI MDM: CPT

## 2019-12-20 PROCEDURE — 84436 ASSAY OF TOTAL THYROXINE: CPT

## 2019-12-20 PROCEDURE — 84443 ASSAY THYROID STIM HORMONE: CPT

## 2019-12-20 PROCEDURE — 80053 COMPREHEN METABOLIC PANEL: CPT

## 2019-12-20 PROCEDURE — 86850 RBC ANTIBODY SCREEN: CPT

## 2019-12-20 PROCEDURE — 84132 ASSAY OF SERUM POTASSIUM: CPT

## 2019-12-20 PROCEDURE — 82435 ASSAY OF BLOOD CHLORIDE: CPT

## 2019-12-20 PROCEDURE — 86901 BLOOD TYPING SEROLOGIC RH(D): CPT

## 2019-12-20 PROCEDURE — 86803 HEPATITIS C AB TEST: CPT

## 2019-12-20 PROCEDURE — 85027 COMPLETE CBC AUTOMATED: CPT

## 2019-12-20 PROCEDURE — 71045 X-RAY EXAM CHEST 1 VIEW: CPT

## 2019-12-20 PROCEDURE — 86900 BLOOD TYPING SEROLOGIC ABO: CPT

## 2019-12-20 PROCEDURE — 81001 URINALYSIS AUTO W/SCOPE: CPT

## 2019-12-20 PROCEDURE — 80048 BASIC METABOLIC PNL TOTAL CA: CPT

## 2019-12-20 PROCEDURE — 83605 ASSAY OF LACTIC ACID: CPT

## 2019-12-20 PROCEDURE — 84480 ASSAY TRIIODOTHYRONINE (T3): CPT

## 2019-12-20 PROCEDURE — 83615 LACTATE (LD) (LDH) ENZYME: CPT

## 2019-12-20 RX ORDER — LEVOTHYROXINE SODIUM 125 MCG
2 TABLET ORAL
Qty: 30 | Refills: 0
Start: 2019-12-20

## 2019-12-20 RX ORDER — LEVOTHYROXINE SODIUM 125 MCG
1 TABLET ORAL
Qty: 0 | Refills: 0 | DISCHARGE

## 2019-12-20 RX ORDER — CEFEPIME 1 G/1
2 INJECTION, POWDER, FOR SOLUTION INTRAMUSCULAR; INTRAVENOUS
Qty: 40 | Refills: 0
Start: 2019-12-20 | End: 2019-12-29

## 2019-12-20 RX ORDER — SENNA PLUS 8.6 MG/1
2 TABLET ORAL
Qty: 0 | Refills: 0 | DISCHARGE
Start: 2019-12-20

## 2019-12-20 RX ORDER — CEFEPIME 1 G/1
2 INJECTION, POWDER, FOR SOLUTION INTRAMUSCULAR; INTRAVENOUS
Qty: 44 | Refills: 0
Start: 2019-12-20 | End: 2019-12-30

## 2019-12-20 RX ORDER — POLYETHYLENE GLYCOL 3350 17 G/17G
17 POWDER, FOR SOLUTION ORAL
Qty: 0 | Refills: 0 | DISCHARGE
Start: 2019-12-20

## 2019-12-20 RX ADMIN — CEFEPIME 100 MILLIGRAM(S): 1 INJECTION, POWDER, FOR SOLUTION INTRAMUSCULAR; INTRAVENOUS at 05:02

## 2019-12-20 RX ADMIN — Medication 250 MICROGRAM(S): at 05:01

## 2019-12-20 RX ADMIN — CEFEPIME 100 MILLIGRAM(S): 1 INJECTION, POWDER, FOR SOLUTION INTRAMUSCULAR; INTRAVENOUS at 16:05

## 2019-12-20 NOTE — DISCHARGE NOTE PROVIDER - NSDCCPCAREPLAN_GEN_ALL_CORE_FT
PRINCIPAL DISCHARGE DIAGNOSIS  Diagnosis: Hypothyroid  Assessment and Plan of Treatment: -Repeat TSH in 4 weeks with your Primary Care Physician   -Maintain on increased dose of synthroid as above      SECONDARY DISCHARGE DIAGNOSES  Diagnosis: Bacteremia due to Pseudomonas  Assessment and Plan of Treatment: -Discharge home on 11 more days of IV Cefepeme q 12 hous   -You will need Follow up with oncology in 1 week   -Take all antibiotics as ordered.  -If you develop fever, chills, malaise, or change in mental status call your Health Care Provider or go to the Emergency Department.  Nutrition is important, eat small frequent meals to help ensure you get adequate calories.  Do not stay in bed all day!  Increase your activity daily as tolerated.      Diagnosis: KENDALL (acute kidney injury)  Assessment and Plan of Treatment: Creatinine normally at 2.0.   Advised to Follow up with oncologist and PCP in 1 week after discharge for review of her hospital course    Diagnosis: Constipation  Assessment and Plan of Treatment: Bowel regimen w/ senna and miralax

## 2019-12-20 NOTE — PROGRESS NOTE ADULT - SUBJECTIVE AND OBJECTIVE BOX
INTERVAL HPI/OVERNIGHT EVENTS:  Pt seen and examined at bedside.     Allergies/Intolerance: No Known Allergies      MEDICATIONS  (STANDING):  cefepime   IVPB 2000 milliGRAM(s) IV Intermittent every 12 hours  cefepime   IVPB      heparin  Injectable 5000 Unit(s) SubCutaneous every 12 hours  levothyroxine 250 MICROGram(s) Oral daily  polyethylene glycol 3350 17 Gram(s) Oral daily  senna 2 Tablet(s) Oral at bedtime    MEDICATIONS  (PRN):        ROS: all systems reviewed and wnl      PHYSICAL EXAMINATION:  Vital Signs Last 24 Hrs  T(C): 36.7 (20 Dec 2019 05:57), Max: 36.9 (19 Dec 2019 17:15)  T(F): 98 (20 Dec 2019 05:57), Max: 98.4 (19 Dec 2019 17:15)  HR: 88 (20 Dec 2019 05:57) (87 - 95)  BP: 104/65 (20 Dec 2019 05:57) (104/65 - 117/70)  BP(mean): --  RR: 18 (20 Dec 2019 05:57) (18 - 18)  SpO2: 100% (20 Dec 2019 05:57) (98% - 100%)  CAPILLARY BLOOD GLUCOSE          12-19 @ 07:01  -  12-20 @ 07:00  --------------------------------------------------------  IN: 660 mL / OUT: 0 mL / NET: 660 mL        GENERAL:   NECK: supple, No JVD  CHEST/LUNG: clear to auscultation bilaterally; no rales, rhonchi, or wheezing b/l  HEART: normal S1, S2  ABDOMEN: BS+, soft, ND, NT   EXTREMITIES:  pulses palpable; no clubbing, cyanosis, or edema b/l LEs  SKIN: no rashes or lesions      LABS:                        7.0    5.56  )-----------( 88       ( 19 Dec 2019 08:05 )             20.9 INTERVAL HPI/OVERNIGHT EVENTS:  Pt seen and examined at bedside.     Allergies/Intolerance: No Known Allergies      MEDICATIONS  (STANDING):  cefepime   IVPB 2000 milliGRAM(s) IV Intermittent every 12 hours  cefepime   IVPB      heparin  Injectable 5000 Unit(s) SubCutaneous every 12 hours  levothyroxine 250 MICROGram(s) Oral daily  polyethylene glycol 3350 17 Gram(s) Oral daily  senna 2 Tablet(s) Oral at bedtime    MEDICATIONS  (PRN):        ROS: all systems reviewed and wnl      PHYSICAL EXAMINATION:  Vital Signs Last 24 Hrs  T(C): 36.7 (20 Dec 2019 05:57), Max: 36.9 (19 Dec 2019 17:15)  T(F): 98 (20 Dec 2019 05:57), Max: 98.4 (19 Dec 2019 17:15)  HR: 88 (20 Dec 2019 05:57) (87 - 95)  BP: 104/65 (20 Dec 2019 05:57) (104/65 - 117/70)  BP(mean): --  RR: 18 (20 Dec 2019 05:57) (18 - 18)  SpO2: 100% (20 Dec 2019 05:57) (98% - 100%)  CAPILLARY BLOOD GLUCOSE          12-19 @ 07:01  -  12-20 @ 07:00  --------------------------------------------------------  IN: 660 mL / OUT: 0 mL / NET: 660 mL        GENERAL: stable, mediport in place, no fevers  NECK: supple, No JVD  CHEST/LUNG: clear to auscultation bilaterally; no rales, rhonchi, or wheezing b/l  HEART: normal S1, S2  ABDOMEN: BS+, soft, ND, NT   EXTREMITIES:  pulses palpable; no clubbing, cyanosis, or edema b/l LEs  SKIN: no rashes or lesions      LABS:                        7.0    5.56  )-----------( 88       ( 19 Dec 2019 08:05 )             20.9

## 2019-12-20 NOTE — DISCHARGE NOTE NURSING/CASE MANAGEMENT/SOCIAL WORK - PATIENT PORTAL LINK FT
You can access the FollowMyHealth Patient Portal offered by [facility] by registering at the following website: [email or web address]. By joining [facility]’s FollowMyHealth portal, you will also be able to view your health information using other applications (apps) compatible with our system.

## 2019-12-20 NOTE — PROGRESS NOTE ADULT - PROBLEM SELECTOR PLAN 4
avoid nephrotoxics. monitor intake and output. Has KENDALL on CKD IV. IVF normal saline stopped. Creatinine the same today 1.80, Mild bilateral hydro might be contributing to KENDALL. Spoke to Oncology team in [facility] regarding plans for surgery on uterus. They informed me CT scans appeared same as before. Creatinine   normally at 2.0.

## 2019-12-20 NOTE — PROGRESS NOTE ADULT - PROBLEM SELECTOR PLAN 3
Blood cultures positive for Psudomonas 4/4. Continue Cefapeme 2 grams every 12 hours. Likely  source. Urine culture negative. Await sensitivities. Blood cultures positive for Psudomonas 4/4. Continue Cefapeme 2 grams every 12 hours. Likely  source. Urine culture negative. Sensitive to Pseudomonas.   Discharge home on 10 more days of IV Cefepeme.

## 2019-12-20 NOTE — PROGRESS NOTE ADULT - SUBJECTIVE AND OBJECTIVE BOX
CC: f/u for pseudomonas bacteremia    Patient reports she is going home    REVIEW OF SYSTEMS:  All other review of systems negative (Comprehensive ROS)    Antimicrobials Day #  :3/14  cefepime   IVPB 2000 milliGRAM(s) IV Intermittent every 12 hours  cefepime   IVPB        Other Medications Reviewed    T(F): 98.3 (12-20-19 @ 08:38), Max: 98.4 (12-19-19 @ 17:15)  HR: 90 (12-20-19 @ 08:38)  BP: 104/66 (12-20-19 @ 08:38)  RR: 18 (12-20-19 @ 08:38)  SpO2: 96% (12-20-19 @ 08:38)  Wt(kg): --    PHYSICAL EXAM:  General: alert, no acute distress  Eyes:  anicteric, no conjunctival injection, no discharge  Oropharynx: no lesions or injection 	  Neck: supple, without adenopathy  Lungs: clear to auscultation  Heart: regular rate and rhythm; no murmur, rubs or gallops  Abdomen: soft, nondistended, nontender, without mass or organomegaly  Skin: no lesions  Extremities: no clubbing, cyanosis, . legs with  edema  Neurologic: alert, oriented, moves all extremities    LAB RESULTS:                        7.0    5.56  )-----------( 88       ( 19 Dec 2019 08:05 )             20.9               MICROBIOLOGY:  RECENT CULTURES:  12-18 @ 08:59 .Blood Blood-Peripheral     No growth to date.    Growth in aerobic bottle: Gram Negative Rods    12-17 @ 20:31 .Urine Clean Catch (Midstream)     No growth      12-17 @ 03:09 .Blood Blood-Catheter Blood Culture PCR    Growth in aerobic bottle: Pseudomonas aeruginosa  See previous culture [id number]  "Due to technical problems, Proteus sp. will Not be reported as part of  the BCID panel until further notice"  ***Blood Panel PCR results on this specimen are available  approximately 3 hours after the Gram stain result.***  Gram stain, PCR, and/or culture results may not always  correspond due to difference in methodologies.  ************************************************************  This PCR assay was performed using [employer].  The following targets are tested for: Enterococcus,  vancomycin resistant enterococci, Listeria monocytogenes,  coagulase negative staphylococci, S. aureus,  methicillin resistant S. aureus, Streptococcus agalactiae  (Group B), S. pneumoniae, S. pyogenes (Group A),  Acinetobacter baumannii, Enterobacter cloacae, E. coli,  Klebsiella oxytoca, K. pneumoniae, Proteus sp.,  Serratia marcescens, Haemophilus influenzae,  Neisseria meningitidis, Pseudomonas aeruginosa, Candida  albicans, C. glabrata, C krusei, C parapsilosis,  C. tropicalis and the KPC resistance gene.    Growth in aerobic bottle: Gram Negative Rods        RADIOLOGY REVIEWED:    < from: CT Abdomen and Pelvis w/ Oral Cont (12.15.19 @ 15:40) >    EXAM:  CT ABDOMEN AND PELVIS OC                            PROCEDURE DATE:  12/15/2019            INTERPRETATION:  CLINICAL INFORMATION: Patient with cervical cancer   status post chemoradiation in October and November of this year   presenting with lower abdominal discomfort for 7 to 10 days.    COMPARISON: None.    PROCEDURE:   CT of the Abdomen and Pelvis was performed without intravenous contrast.   Intravenous contrast: None.  Oral contrast: positive contrast was administered.  Sagittal and coronal reformats were performed.    FINDINGS:    LOWER CHEST: Coronary artery calcifications. Slightly asymmetric tissue   in the left retroareolar region (2:5).    Evaluation of the solid organs and vasculature is limited without the   administration of intravenous contrast.    LIVER: Within normal limits.  BILE DUCTS: Normal caliber.  GALLBLADDER: Within normal limits.  SPLEEN: Within normal limits.  PANCREAS: Within normal limits.  ADRENALS: Nonspecific coarse calcifications involving the right adrenal   gland. Left adrenal gland within normal limits.  KIDNEYS/URETERS: Mild bilateral hydroureteronephrosis to the level of the   urinary bladder without obstructing calculus visualized.    BLADDER: Diffuse wall thickening and surrounding inflammatory changes.  REPRODUCTIVE ORGANS: Air within the endometrial cavity. Evaluation of   patient's cervical cancer is limited on noncontrast CT technique.    BOWEL: Small hiatal hernia. No bowel obstruction. Colonic diverticulosis.   Contrast does not fill the appendix, however, there is no significant   dilatation or surrounding inflammatory change.  PERITONEUM: No ascites.  VESSELS: Atherosclerotic changes.  RETROPERITONEUM/LYMPH NODES: No lymphadenopathy.    ABDOMINAL WALL: Small fat-containing umbilical hernia. Nonspecific mild   soft tissue swelling of the midline dorsal soft tissues and left gluteal   region.  BONES: Degenerative changes with hypertrophy of the lumbar spinous   processes.    IMPRESSION:     No bowel obstruction.    Mild bilateral hydronephrosis to the level of the urinary bladder which   demonstrates mild wall thickening and surrounding inflammatory changes.   Correlate with urinalysis; findings may be related to radiation changes.      < end of copied text >            Assessment:  Patient with metastatic cervical ca, bilateral mild hydro, mediport, pseudomonas bacteremia, source not apparent, could be port , will try to salvage. patient seen at [facility] so does not want anything done here. explained risk for relapse and need for f/u. She will see an ID md at [place] does not want to see me . reviewed possible need for kidney decompression, will f/u at [facility]   Plan:  11 more days of cefepime  told to f/u with ID before or just after the antibiotics are done  send weekly labs to her oncologist since she refuses follow up with me

## 2019-12-20 NOTE — DISCHARGE NOTE PROVIDER - NSDCFUADDINST_GEN_ALL_CORE_FT
-Follow with your Primary care Physician in 1 week to review your hospital course  -complete antibiotic until finished for a total course of 11 days   -Bring all discharge documents and prescriptions with you at the time of your appointments   -You may return to the Emergency department for any worsening or return of your symptoms

## 2019-12-20 NOTE — DISCHARGE NOTE NURSING/CASE MANAGEMENT/SOCIAL WORK - NSSCTYPOFSERV_GEN_ALL_CORE
Yasmine will provide IV antibiotic, flush supplies and skilled RN teaching visit for medication administration. Start of care for 12/21/19 9am dose.

## 2019-12-20 NOTE — DISCHARGE NOTE PROVIDER - HOSPITAL COURSE
69 year old female PMH cervical ca c/b mets to uterus, lymph nodes, s/p radiation therapy dc'd 10/15, oral chemo 11/15 presents with a cc of constipation x7-10 days, no bowel movements, is passing gas, no prior abdominal surgeries, no urinary sx. Tolerating PO today. tried saline enema without improvement at home. Denies f/c/cp/sob. Denies headache, syncope, lightheadedness, dizziness. CT abdomen in ER shows no SBO. TSH elevated 77 in ER - patient admits to poor compliance with Synthroid recent weeks. Restarted at higher dose 250 mcg/day.         PT admitted with constipation and placed on bowel regimen with improvement. Continue oral senacott and miralax. Hospital course complicated by Blood cultures positive for Pseudomonas 4/4. Continue Cefepime 2 grams every 12 hours. Likely  source. Urine culture negative. Sensitive to Pseudomonas. Discharge home on 11 more days of IV Cefepeme. ID evaluated. For elevated TSH likely in part due to poor compliance. Increased dose to 250 mcg/day. TSH elevated 76, low free T-4 3.7. Repeat TSH in 4 weeks.  Patient noted with KENDALL on CKD IV. IVF normal saline stopped. Creatinine the same today 1.80, Mild bilateral hydro might be contributing to KENDALL. Spoke to Oncology team in [facility] regarding plans for surgery on uterus. They informed me CT scans appeared same as before. Creatinine normally at 2.0. Advised to Follow up with oncologist and PCP in 1 week after discharge for review of her hospital course 69 year old female PMH cervical ca with mets to uterus, lymph nodes, s/p radiation therapy dc'd 10/15, oral chemo 11/15 presents with a cc of constipation x 7-10 days, no bowel movements, is passing gas, no prior abdominal surgeries, no urinary sx. Tried saline enema without improvement at home. Denies f/c/cp/sob. Denies headache, syncope, lightheadedness, dizziness. CT abdomen in ER shows no SBO. TSH elevated 77 in ER - patient admits to poor compliance with Synthroid recent weeks. Restarted at higher dose 250 mcg/day. Recent radiation treatments may have affected thyroid function.            PT admitted with constipation and placed on bowel regimen with eventual improvement. Continue oral senacott and miralax. Hospital course complicated by Blood cultures positive for Pseudomonas 4/4. Continue Cefepime 2 grams every 12 hours. Likely  source. Urine culture negative. Pan-sensitive Pseudomonas.         Discharged home on 11 more days of IV Cefepeme. ID evaluated. For elevated TSH likely in part due to poor compliance. Increased dose to 250 mcg/day. TSH elevated 76, low free T-4 3.7. Repeat TSH in 4 weeks.  Patient noted with KENDALL on CKD IV. IVF normal saline stopped. Creatinine stabilized 1.80. Mild bilateral hydro might be contributing to KENDALL. Spoke to Oncology team in [facility] regarding plans for surgery on uterus. They informed me CT scans appeared same as before. Creatinine normally at 2.0. Advised to Follow up with oncologist and PCP in 1 week after discharge for review of her hospital course. CXR is clear.         See copy of CT report and med list. Spoke to oncologist in [facility]. 69 year old female PMH cervical ca with mets to uterus, lymph nodes, s/p radiation therapy dc'd 10/15, oral chemo 11/15 presents with a cc of constipation x 7-10 days, no bowel movements, is passing gas, no prior abdominal surgeries, no urinary sx. Tried saline enema without improvement at home. Denies cp/sob. Denies headache, syncope, lightheadedness, dizziness. CT abdomen in ER shows no SBO. TSH elevated 77 in ER - patient admits to poor compliance with Synthroid recent weeks. Restarted at higher dose 250 mcg/day. Recent radiation treatments may have affected thyroid function.            PT admitted with constipation and placed on bowel regimen with eventual improvement. Continue oral senacott and miralax. Hospital course complicated by Blood cultures positive for Pseudomonas 4/4. Continue Cefepime 2 grams every 12 hours. Likely  source. Urine culture negative. Pan-sensitive Pseudomonas.         Discharged home on 11 more days of IV Cefepeme. ID evaluated. For elevated TSH likely in part due to poor compliance. Increased dose to 250 mcg/day. TSH elevated 76, low free T-4 3.7. Repeat TSH in 4 weeks.  Patient noted with KENDALL on CKD IV. IVF normal saline stopped. Creatinine stabilized 1.80. Mild bilateral hydro might be contributing to KENDALL. Spoke to Oncology team in [facility] regarding plans for surgery on uterus. They informed me CT scans appeared same as before. Creatinine normally at 2.0. Advised to Follow up with oncologist and PCP in 1 week after discharge for review of her hospital course. CXR is clear.         See copy of CT report and med list. Spoke to oncologist in [facility]. 69 year old female PMH cervical ca with mets to uterus, lymph nodes, s/p radiation therapy dc'd 10/15, oral chemo 11/15 presents with a cc of constipation x 7-10 days, no bowel movements, is passing gas, no prior abdominal surgeries, no urinary sx. Tried saline enema without improvement at home. Denies cp/sob. Denies headache, syncope, lightheadedness, dizziness. CT abdomen in ER shows no SBO. TSH elevated 77 in ER - patient admits to poor compliance with Synthroid recent weeks. Restarted at higher dose 250 mcg/day. Recent radiation treatments may have affected thyroid function.            PT admitted with constipation and placed on bowel regimen with eventual improvement. Continue oral senacott and miralax. Hospital course complicated by Blood cultures positive for Pseudomonas 4/4. Continue Cefepime 2 grams every 12 hours. Likely  source. Urine culture negative. Pan-sensitive Pseudomonas on blood cultures.         Discharged home on 11 more days of IV Cefepeme via med-port. ID evaluated. For elevated TSH likely in part due to poor compliance. Increased dose to 250 mcg/day. TSH elevated 76, low free T-4 3.7. Repeat TSH in 4 weeks.  Patient noted with KENDALL on CKD IV. IVF normal saline stopped. Creatinine stabilized 1.80. Mild bilateral hydro might be contributing to KENDALL. [facility] They informed me CT scans appeared same as before. Creatinine normally at 2.0. Advised to Follow up with oncologist and PCP in 1 week after discharge.  CXR is clear. Mediport in place. See copy of CT report and med list.

## 2019-12-20 NOTE — DISCHARGE NOTE PROVIDER - NSDCMRMEDTOKEN_GEN_ALL_CORE_FT
cefepime 2 g injection: 2 gram(s) intravenously every 12 hours til 19     Dx: Pseudomonas bacteremia   levothyroxine 125 mcg (0.125 mg) oral tablet: 2 tab(s) orally once a day  Magnesium: 1 tab(s) orally once a day, As Needed  Norvasc 5 mg oral tablet: 1 tab(s) orally once a day  polyethylene glycol 3350 oral powder for reconstitution: 17 gram(s) orally once a day  Protonix 20 mg oral delayed release tablet: 1 tab(s) orally once a day  senna oral tablet: 2 tab(s) orally once a day (at bedtime)  Zofran ODT 8 mg oral tablet, disintegratin tab(s) orally 3 times a day, As Needed

## 2019-12-23 LAB
CULTURE RESULTS: SIGNIFICANT CHANGE UP
SPECIMEN SOURCE: SIGNIFICANT CHANGE UP

## 2019-12-24 LAB
CULTURE RESULTS: SIGNIFICANT CHANGE UP
CULTURE RESULTS: SIGNIFICANT CHANGE UP
SPECIMEN SOURCE: SIGNIFICANT CHANGE UP
SPECIMEN SOURCE: SIGNIFICANT CHANGE UP

## 2022-11-11 NOTE — H&P ADULT - NSHPPHYSICALEXAM_GEN_ALL_CORE
Vaccine status unknown
PHYSICAL EXAM:  Vital Signs Last 24 Hrs  T(C): 36.7 (12-15-19 @ 15:23), Max: 36.7 (12-15-19 @ 13:00)  T(F): 98.1 (12-15-19 @ 15:23), Max: 98.1 (12-15-19 @ 15:23)  HR: 73 (12-15-19 @ 15:23) (73 - 101)  BP: 98/53 (12-15-19 @ 15:23) (98/53 - 131/65)  BP(mean): --  RR: 16 (12-15-19 @ 15:23) (16 - 18)  SpO2: 100% (12-15-19 @ 15:23) (98% - 100%)  GENERAL: No apparent distress, appears stated age  HEAD:  Atraumatic, Normocephalic  EYES: Conjunctiva and sclera clear, no discharge  ENMT: Moist mucous membranes, no nasal discharge  NECK: Supple, no JVD  CHEST/LUNG: Clear to auscultation; no rales, wheeze or rubs  HEART: Regular rate and rhythm; no murmurs, rubs or gallops  ABDOMEN: Soft, Nontender, Nondistended; Bowel sounds present  EXTREMITIES:  No clubbing, cyanosis or edema  SKIN: No rash or new discoloration  NERVOUS SYSTEM:  Alert & Oriented; Bilateral lower extremity mobile, sensation to light touch intact

## 2023-03-16 NOTE — DISCHARGE NOTE NURSING/CASE MANAGEMENT/SOCIAL WORK - NSCORESITESY/N_GEN_A_CORE_RD
You were seen in the emergency department for vomiting and diarrhea with associated abdominal pain.  This may be due to a virus, and you should stay well hydrated.  Take Zofran as prescribed if needed for nausea.  Please follow-up with your transplant doctor regarding your ED visit.  Return to the emergency department for any worsening symptoms.  
No

## 2023-07-02 NOTE — ED PROVIDER NOTE - SECONDARY DIAGNOSIS.
ED TO INPATIENT HANDOFF NOTE:  Admitted for infection after nasal biopsy. Antibiotics started. Portuguese speaking, understands some english. A&Ox4.    ADMISSION DIAGNOSIS:   Facial cellulitis [L03.211]      VITALS:  Vitals:    07/02/23 0200 07/02/23 0230 07/02/23 0400   Temp:      Pulse: (!) 58 64 (!) 52   Resp:   16   SpO2: 98% 98% 98%   BP: (!) 156/68 (!) 174/73 134/69       Oxygen Needs  room air      Mental Status   Alert and oriented      Safety     None      Tele: No      RHYTHM:      Tele Box:  Final Check completed with centralized telemetry unit prior to ED transfer.       Mobility    Independent      Isolation  None      Residence:   House      Additional Information: Bradycardia baseline      ER Nurse: Yu Logan RN     Phone:   Constipation KENDALL (acute kidney injury)